# Patient Record
Sex: MALE | Race: WHITE | HISPANIC OR LATINO | URBAN - METROPOLITAN AREA
[De-identification: names, ages, dates, MRNs, and addresses within clinical notes are randomized per-mention and may not be internally consistent; named-entity substitution may affect disease eponyms.]

---

## 2020-11-27 ENCOUNTER — EMERGENCY (EMERGENCY)
Facility: HOSPITAL | Age: 50
LOS: 1 days | Discharge: HOME | End: 2020-11-27
Attending: EMERGENCY MEDICINE | Admitting: EMERGENCY MEDICINE
Payer: MEDICAID

## 2020-11-27 VITALS
DIASTOLIC BLOOD PRESSURE: 80 MMHG | HEIGHT: 73 IN | HEART RATE: 82 BPM | WEIGHT: 235.01 LBS | SYSTOLIC BLOOD PRESSURE: 158 MMHG | RESPIRATION RATE: 18 BRPM | TEMPERATURE: 99 F | OXYGEN SATURATION: 97 %

## 2020-11-27 DIAGNOSIS — Z91.011 ALLERGY TO MILK PRODUCTS: ICD-10-CM

## 2020-11-27 DIAGNOSIS — R07.89 OTHER CHEST PAIN: ICD-10-CM

## 2020-11-27 DIAGNOSIS — R05 COUGH: ICD-10-CM

## 2020-11-27 DIAGNOSIS — Z20.828 CONTACT WITH AND (SUSPECTED) EXPOSURE TO OTHER VIRAL COMMUNICABLE DISEASES: ICD-10-CM

## 2020-11-27 DIAGNOSIS — R07.9 CHEST PAIN, UNSPECIFIED: ICD-10-CM

## 2020-11-27 LAB
ALBUMIN SERPL ELPH-MCNC: 3.9 G/DL — SIGNIFICANT CHANGE UP (ref 3.4–5)
ALP SERPL-CCNC: 63 U/L — SIGNIFICANT CHANGE UP (ref 40–120)
ALT FLD-CCNC: 40 U/L — SIGNIFICANT CHANGE UP (ref 12–42)
AMPHET UR-MCNC: NEGATIVE — SIGNIFICANT CHANGE UP
ANION GAP SERPL CALC-SCNC: 11 MMOL/L — SIGNIFICANT CHANGE UP (ref 9–16)
APTT BLD: 29 SEC — SIGNIFICANT CHANGE UP (ref 27.5–35.5)
AST SERPL-CCNC: 28 U/L — SIGNIFICANT CHANGE UP (ref 15–37)
BARBITURATES UR SCN-MCNC: NEGATIVE — SIGNIFICANT CHANGE UP
BASOPHILS # BLD AUTO: 0.08 K/UL — SIGNIFICANT CHANGE UP (ref 0–0.2)
BASOPHILS NFR BLD AUTO: 0.7 % — SIGNIFICANT CHANGE UP (ref 0–2)
BENZODIAZ UR-MCNC: NEGATIVE — SIGNIFICANT CHANGE UP
BILIRUB SERPL-MCNC: 0.3 MG/DL — SIGNIFICANT CHANGE UP (ref 0.2–1.2)
BUN SERPL-MCNC: 27 MG/DL — HIGH (ref 7–23)
CALCIUM SERPL-MCNC: 9.4 MG/DL — SIGNIFICANT CHANGE UP (ref 8.5–10.5)
CHLORIDE SERPL-SCNC: 102 MMOL/L — SIGNIFICANT CHANGE UP (ref 96–108)
CK SERPL-CCNC: 818 U/L — HIGH (ref 39–308)
CO2 SERPL-SCNC: 28 MMOL/L — SIGNIFICANT CHANGE UP (ref 22–31)
COCAINE METAB.OTHER UR-MCNC: NEGATIVE — SIGNIFICANT CHANGE UP
CREAT SERPL-MCNC: 1.47 MG/DL — HIGH (ref 0.5–1.3)
D DIMER BLD IA.RAPID-MCNC: <187 NG/ML DDU — SIGNIFICANT CHANGE UP
EOSINOPHIL # BLD AUTO: 0.12 K/UL — SIGNIFICANT CHANGE UP (ref 0–0.5)
EOSINOPHIL NFR BLD AUTO: 1 % — SIGNIFICANT CHANGE UP (ref 0–6)
GLUCOSE SERPL-MCNC: 147 MG/DL — HIGH (ref 70–99)
HCT VFR BLD CALC: 39.5 % — SIGNIFICANT CHANGE UP (ref 39–50)
HGB BLD-MCNC: 13.5 G/DL — SIGNIFICANT CHANGE UP (ref 13–17)
IMM GRANULOCYTES NFR BLD AUTO: 0.3 % — SIGNIFICANT CHANGE UP (ref 0–1.5)
INR BLD: 1.02 — SIGNIFICANT CHANGE UP (ref 0.88–1.16)
LYMPHOCYTES # BLD AUTO: 37.4 % — SIGNIFICANT CHANGE UP (ref 13–44)
LYMPHOCYTES # BLD AUTO: 4.45 K/UL — HIGH (ref 1–3.3)
MAGNESIUM SERPL-MCNC: 2.1 MG/DL — SIGNIFICANT CHANGE UP (ref 1.6–2.6)
MCHC RBC-ENTMCNC: 31 PG — SIGNIFICANT CHANGE UP (ref 27–34)
MCHC RBC-ENTMCNC: 34.2 GM/DL — SIGNIFICANT CHANGE UP (ref 32–36)
MCV RBC AUTO: 90.8 FL — SIGNIFICANT CHANGE UP (ref 80–100)
METHADONE UR-MCNC: NEGATIVE — SIGNIFICANT CHANGE UP
MONOCYTES # BLD AUTO: 0.95 K/UL — HIGH (ref 0–0.9)
MONOCYTES NFR BLD AUTO: 8 % — SIGNIFICANT CHANGE UP (ref 2–14)
NEUTROPHILS # BLD AUTO: 6.27 K/UL — SIGNIFICANT CHANGE UP (ref 1.8–7.4)
NEUTROPHILS NFR BLD AUTO: 52.6 % — SIGNIFICANT CHANGE UP (ref 43–77)
NRBC # BLD: 0 /100 WBCS — SIGNIFICANT CHANGE UP (ref 0–0)
NT-PROBNP SERPL-SCNC: 21 PG/ML — SIGNIFICANT CHANGE UP
OPIATES UR-MCNC: NEGATIVE — SIGNIFICANT CHANGE UP
PCP SPEC-MCNC: SIGNIFICANT CHANGE UP
PCP UR-MCNC: NEGATIVE — SIGNIFICANT CHANGE UP
PLATELET # BLD AUTO: 295 K/UL — SIGNIFICANT CHANGE UP (ref 150–400)
POTASSIUM SERPL-MCNC: 3.4 MMOL/L — LOW (ref 3.5–5.3)
POTASSIUM SERPL-SCNC: 3.4 MMOL/L — LOW (ref 3.5–5.3)
PROT SERPL-MCNC: 7.3 G/DL — SIGNIFICANT CHANGE UP (ref 6.4–8.2)
PROTHROM AB SERPL-ACNC: 12.2 SEC — SIGNIFICANT CHANGE UP (ref 10.6–13.6)
RBC # BLD: 4.35 M/UL — SIGNIFICANT CHANGE UP (ref 4.2–5.8)
RBC # FLD: 13.1 % — SIGNIFICANT CHANGE UP (ref 10.3–14.5)
SARS-COV-2 RNA SPEC QL NAA+PROBE: SIGNIFICANT CHANGE UP
SODIUM SERPL-SCNC: 141 MMOL/L — SIGNIFICANT CHANGE UP (ref 132–145)
THC UR QL: POSITIVE
TROPONIN I SERPL-MCNC: <0.017 NG/ML — LOW (ref 0.02–0.06)
TROPONIN I SERPL-MCNC: <0.017 NG/ML — LOW (ref 0.02–0.06)
WBC # BLD: 11.91 K/UL — HIGH (ref 3.8–10.5)
WBC # FLD AUTO: 11.91 K/UL — HIGH (ref 3.8–10.5)

## 2020-11-27 PROCEDURE — 93010 ELECTROCARDIOGRAM REPORT: CPT

## 2020-11-27 PROCEDURE — 71045 X-RAY EXAM CHEST 1 VIEW: CPT | Mod: 26

## 2020-11-27 PROCEDURE — 99285 EMERGENCY DEPT VISIT HI MDM: CPT | Mod: 25

## 2020-11-27 RX ORDER — IBUPROFEN 200 MG
600 TABLET ORAL ONCE
Refills: 0 | Status: DISCONTINUED | OUTPATIENT
Start: 2020-11-27 | End: 2020-11-27

## 2020-11-27 RX ORDER — AZITHROMYCIN 500 MG/1
500 TABLET, FILM COATED ORAL ONCE
Refills: 0 | Status: DISCONTINUED | OUTPATIENT
Start: 2020-11-27 | End: 2020-11-27

## 2020-11-27 RX ORDER — KETOROLAC TROMETHAMINE 30 MG/ML
30 SYRINGE (ML) INJECTION ONCE
Refills: 0 | Status: DISCONTINUED | OUTPATIENT
Start: 2020-11-27 | End: 2020-11-27

## 2020-11-27 RX ADMIN — Medication 30 MILLIGRAM(S): at 07:46

## 2020-11-27 NOTE — ED ADULT NURSE NOTE - CAS TRG GENERAL NORM CIRC DET
Telephone Encounter by Jenelle Dhillon MD at 09/14/17 10:18 AM     Author:  Jenelle Dhillon MD Service:  (none) Author Type:  Physician     Filed:  09/14/17 10:18 AM Encounter Date:  9/14/2017 Status:  Signed     :  Jenelle Dhillon MD (Physician)            Please call patient, if not having GI symptoms remove from OV and schedule directly for colonocopy.  Screening  suprep[RA1.1T]        Revision History        User Key Date/Time User Provider Type Action    > RA1.1 09/14/17 10:18 AM Jenelle Dhillon MD Physician Sign    T - Template            
Telephone Encounter by Neahl Mix RMA at 09/14/17 10:29 AM     Author:  Nehal Mix RMA Service:  (none) Author Type:  Medical Assistant     Filed:  09/14/17 10:29 AM Encounter Date:  9/14/2017 Status:  Signed     :  Nehal Mix RMA (Medical Assistant)            Left message on answering machine to call back.  Electronically Signed by:    SAM Rosas , 9/14/2017[LR1.1T]        Revision History        User Key Date/Time User Provider Type Action    > LR1.1 09/14/17 10:29 AM Nehal Mix RMA Medical Assistant Sign    T - Template            
Strong peripheral pulses

## 2020-11-27 NOTE — ED PROVIDER NOTE - CLINICAL SUMMARY MEDICAL DECISION MAKING FREE TEXT BOX
plan to do trop, ddimer, low suspicion for pe, perc neg, ekg wnl, do not suspect dissection, compliant with medications, vss.

## 2020-11-27 NOTE — ED PROVIDER NOTE - CARE PROVIDER_API CALL
Esdras Suárez  CARDIOVASCULAR DISEASE  [address] [facility], 3rd [place] [address]  Phone: [phone]  Fax: [phone]  Follow Up Time:    Esdras Suárez  CARDIOVASCULAR DISEASE  [address], [address]rd [place] [address]  Phone: [phone]  Fax: [phone]  Follow Up Time:     Clary Mcdonough  CRITICAL CARE MEDICINE  [facility] [address] [place] [address]  Phone: [phone]  Fax: [phone]  Follow Up Time:

## 2020-11-27 NOTE — ED ADULT NURSE NOTE - OBJECTIVE STATEMENT
49 yo M c/o chest pain. Pt reports while at rest feeling a sharp pain radiating through his L chest and down his side. Pt also reports associated neck pain. Pt states he was recently carrying heavy bags on his L side and may have strained himself. No diaphoresis noted, no dyspnea. Denies SOB, N/V/D, headache, dizziness, fever/chills, numbness/tingling, change in bowel or bladder habits. Pt speaking in full complete sentences, ambulatory with steady gait.

## 2020-11-27 NOTE — ED PROVIDER NOTE - PATIENT PORTAL LINK FT
You can access the FollowMyHealth Patient Portal offered by [facility] by registering at the following website: [email or web address]. By joining [facility]’s FollowMyHealth portal, you will also be able to view your health information using other applications (apps) compatible with our system.

## 2020-11-27 NOTE — ED PROVIDER NOTE - PROVIDER TOKENS
PROVIDER:[TOKEN:[[id number]]] PROVIDER:[TOKEN:[[id number]]],PROVIDER:[TOKEN:[[id number]]]

## 2020-11-27 NOTE — ED PROVIDER NOTE - PROGRESS NOTE DETAILS
patient notes improvement. notes 3 weeks ago was treated for 7 days with augmentin for sinus infection. will start on levaquin and repeat trop at 9 am and likely refer to dr medina. spoke to dr medina and will see in the office. signed out to dr zurita normal trop x 2, pt is well appearing, will recommend f/u w cardio and pulm for persistent cough. covid neg here today. copy of results to be provided.

## 2020-11-27 NOTE — ED PROVIDER NOTE - CARE PROVIDERS DIRECT ADDRESSES
[email or web address]

## 2020-11-27 NOTE — ED PROVIDER NOTE - CPE EDP ENMT NORM
Pt lying on cot, when asking pt to change into gown, pt refused and stated \"I'm too cold I dont want to wear one. \" Applied warm blankets on pt. Pt denies any further needs. Call light within reach will continue to monitor.      Henok Michaud RN  08/08/19 [id number]
Pt sitting up at edge of bed. Warm blanket provided for patient comfort. Side rails up x2 with call light in reach. Will continue to monitor.        Zenaida Martinez RN  08/08/19 [id number]
normal...

## 2020-11-27 NOTE — ED ADULT NURSE NOTE - CHPI ED NUR SYMPTOMS NEG
no dizziness/no diaphoresis/no vomiting/no fever/no shortness of breath/no nausea/no congestion/no back pain/no chills/no syncope

## 2020-11-27 NOTE — ED PROVIDER NOTE - OBJECTIVE STATEMENT
patient presents with new onset L sided chest pain, under his pectoral muscle, which started at 5:15 this morning, intermittent, worse with expiration, radiating over L chest. initially felt like a sore rib, but then noticed some radiation and tingling to body and base of neck. denies focal weakness, denies N/V, denies fever, chills, cough. took 2 asa prior to arrival with no improvement. notes 2 months ago, had a dry cough, covid negative 1.5 months ago but cough still persists with intermittent mucous. denies sick contacts. notes 12 days ago traveled [facility] by bus round trip. Hx of HTN, anxiety on lisinopril, and amlodipine. patient presents with new onset L sided chest pain, under his pectoral muscle, which started at 5:15 this morning, intermittent, worse with expiration, radiating over L chest. initially felt like a sore rib, but then noticed some radiation and tingling to body and base of neck. denies focal weakness, denies N/V, denies fever, chills, cough. took 2 asa prior to arrival with no improvement. notes 2 months ago, had a dry cough, covid negative 1.5 months ago but cough still persists with intermittent mucous. denies sick contacts. notes 12 days ago traveled [facility] by bus round trip. Hx of HTN, anxiety on lisinopril, and amlodipine. patient notes no prior cardiac workup or admissions in the past.

## 2020-11-28 RX ORDER — AZITHROMYCIN 500 MG/1
1 TABLET, FILM COATED ORAL
Qty: 6 | Refills: 0
Start: 2020-11-28 | End: 2020-12-02

## 2020-11-28 RX ORDER — SACCHAROMYCES BOULARDII 250 MG
1 POWDER IN PACKET (EA) ORAL
Qty: 14 | Refills: 0
Start: 2020-11-28 | End: 2020-12-04

## 2020-11-28 NOTE — ED POST DISCHARGE NOTE - ADDITIONAL DOCUMENTATION
pt called back. notes he has had worsening of his arthritis pains since starting the levaquin and also describes some migrating tingling sensation. requesting medication switch to azithromycin. sent azithromycin and florastor. pt advised to return to ER for evaluation of numbness, tingling, or if any other new or concerning symptoms arise.

## 2021-06-23 ENCOUNTER — INPATIENT (INPATIENT)
Facility: HOSPITAL | Age: 51
LOS: 6 days | Discharge: HOME HEALTH | DRG: 177 | End: 2021-06-30
Attending: HOSPITALIST | Admitting: HOSPITALIST
Payer: COMMERCIAL

## 2021-06-23 VITALS
HEIGHT: 73 IN | HEART RATE: 104 BPM | SYSTOLIC BLOOD PRESSURE: 108 MMHG | DIASTOLIC BLOOD PRESSURE: 66 MMHG | WEIGHT: 238.1 LBS | RESPIRATION RATE: 16 BRPM | TEMPERATURE: 100 F | OXYGEN SATURATION: 88 %

## 2021-06-23 LAB
ALBUMIN SERPL ELPH-MCNC: 3.4 G/DL — SIGNIFICANT CHANGE UP (ref 3.4–5)
ALP SERPL-CCNC: 89 U/L — SIGNIFICANT CHANGE UP (ref 40–120)
ALT FLD-CCNC: 37 U/L — SIGNIFICANT CHANGE UP (ref 12–42)
ANION GAP SERPL CALC-SCNC: 12 MMOL/L — SIGNIFICANT CHANGE UP (ref 9–16)
APPEARANCE UR: CLEAR — SIGNIFICANT CHANGE UP
APTT BLD: 32.6 SEC — SIGNIFICANT CHANGE UP (ref 27.5–35.5)
AST SERPL-CCNC: 46 U/L — HIGH (ref 15–37)
BASOPHILS # BLD AUTO: 0.01 K/UL — SIGNIFICANT CHANGE UP (ref 0–0.2)
BASOPHILS NFR BLD AUTO: 0.2 % — SIGNIFICANT CHANGE UP (ref 0–2)
BILIRUB SERPL-MCNC: 0.6 MG/DL — SIGNIFICANT CHANGE UP (ref 0.2–1.2)
BILIRUB UR-MCNC: NEGATIVE — SIGNIFICANT CHANGE UP
BUN SERPL-MCNC: 16 MG/DL — SIGNIFICANT CHANGE UP (ref 7–23)
CALCIUM SERPL-MCNC: 8.4 MG/DL — LOW (ref 8.5–10.5)
CHLORIDE SERPL-SCNC: 99 MMOL/L — SIGNIFICANT CHANGE UP (ref 96–108)
CO2 SERPL-SCNC: 26 MMOL/L — SIGNIFICANT CHANGE UP (ref 22–31)
COLOR SPEC: YELLOW — SIGNIFICANT CHANGE UP
CREAT SERPL-MCNC: 1.34 MG/DL — HIGH (ref 0.5–1.3)
CRP SERPL-MCNC: 103 MG/L — HIGH (ref 0–9)
D DIMER BLD IA.RAPID-MCNC: 195 NG/ML DDU — SIGNIFICANT CHANGE UP
DIFF PNL FLD: NEGATIVE — SIGNIFICANT CHANGE UP
EOSINOPHIL # BLD AUTO: 0 K/UL — SIGNIFICANT CHANGE UP (ref 0–0.5)
EOSINOPHIL NFR BLD AUTO: 0 % — SIGNIFICANT CHANGE UP (ref 0–6)
GLUCOSE BLDC GLUCOMTR-MCNC: 158 MG/DL — HIGH (ref 70–99)
GLUCOSE SERPL-MCNC: 143 MG/DL — HIGH (ref 70–99)
GLUCOSE UR QL: NEGATIVE — SIGNIFICANT CHANGE UP
HCT VFR BLD CALC: 42.1 % — SIGNIFICANT CHANGE UP (ref 39–50)
HGB BLD-MCNC: 14.4 G/DL — SIGNIFICANT CHANGE UP (ref 13–17)
IMM GRANULOCYTES NFR BLD AUTO: 0.4 % — SIGNIFICANT CHANGE UP (ref 0–1.5)
INR BLD: 1.21 — HIGH (ref 0.88–1.16)
KETONES UR-MCNC: 15 MG/DL
LACTATE SERPL-SCNC: 1.4 MMOL/L — SIGNIFICANT CHANGE UP (ref 0.4–2)
LEUKOCYTE ESTERASE UR-ACNC: NEGATIVE — SIGNIFICANT CHANGE UP
LYMPHOCYTES # BLD AUTO: 0.66 K/UL — LOW (ref 1–3.3)
LYMPHOCYTES # BLD AUTO: 13.5 % — SIGNIFICANT CHANGE UP (ref 13–44)
MAGNESIUM SERPL-MCNC: 1.7 MG/DL — SIGNIFICANT CHANGE UP (ref 1.6–2.6)
MANUAL SMEAR VERIFICATION: SIGNIFICANT CHANGE UP
MCHC RBC-ENTMCNC: 30.8 PG — SIGNIFICANT CHANGE UP (ref 27–34)
MCHC RBC-ENTMCNC: 34.2 GM/DL — SIGNIFICANT CHANGE UP (ref 32–36)
MCV RBC AUTO: 90 FL — SIGNIFICANT CHANGE UP (ref 80–100)
MONOCYTES # BLD AUTO: 0.16 K/UL — SIGNIFICANT CHANGE UP (ref 0–0.9)
MONOCYTES NFR BLD AUTO: 3.3 % — SIGNIFICANT CHANGE UP (ref 2–14)
NEUTROPHILS # BLD AUTO: 4.05 K/UL — SIGNIFICANT CHANGE UP (ref 1.8–7.4)
NEUTROPHILS NFR BLD AUTO: 82.6 % — HIGH (ref 43–77)
NITRITE UR-MCNC: NEGATIVE — SIGNIFICANT CHANGE UP
NRBC # BLD: 0 /100 WBCS — SIGNIFICANT CHANGE UP (ref 0–0)
NT-PROBNP SERPL-SCNC: 24 PG/ML — SIGNIFICANT CHANGE UP
PH UR: 6 — SIGNIFICANT CHANGE UP (ref 5–8)
PLAT MORPH BLD: NORMAL — SIGNIFICANT CHANGE UP
PLATELET # BLD AUTO: 167 K/UL — SIGNIFICANT CHANGE UP (ref 150–400)
POTASSIUM SERPL-MCNC: 3.1 MMOL/L — LOW (ref 3.5–5.3)
POTASSIUM SERPL-SCNC: 3.1 MMOL/L — LOW (ref 3.5–5.3)
PROT SERPL-MCNC: 7.4 G/DL — SIGNIFICANT CHANGE UP (ref 6.4–8.2)
PROT UR-MCNC: NEGATIVE MG/DL — SIGNIFICANT CHANGE UP
PROTHROM AB SERPL-ACNC: 14.1 SEC — HIGH (ref 10.6–13.6)
RBC # BLD: 4.68 M/UL — SIGNIFICANT CHANGE UP (ref 4.2–5.8)
RBC # FLD: 12.8 % — SIGNIFICANT CHANGE UP (ref 10.3–14.5)
RBC BLD AUTO: NORMAL — SIGNIFICANT CHANGE UP
SARS-COV-2 RNA SPEC QL NAA+PROBE: DETECTED
SODIUM SERPL-SCNC: 137 MMOL/L — SIGNIFICANT CHANGE UP (ref 132–145)
SP GR SPEC: 1.01 — SIGNIFICANT CHANGE UP (ref 1–1.03)
TROPONIN I SERPL-MCNC: <0.017 NG/ML — LOW (ref 0.02–0.06)
TSH SERPL-MCNC: 2.02 UIU/ML — SIGNIFICANT CHANGE UP (ref 0.36–3.74)
UROBILINOGEN FLD QL: 0.2 E.U./DL — SIGNIFICANT CHANGE UP
WBC # BLD: 4.9 K/UL — SIGNIFICANT CHANGE UP (ref 3.8–10.5)
WBC # FLD AUTO: 4.9 K/UL — SIGNIFICANT CHANGE UP (ref 3.8–10.5)

## 2021-06-23 PROCEDURE — 93010 ELECTROCARDIOGRAM REPORT: CPT

## 2021-06-23 PROCEDURE — 99223 1ST HOSP IP/OBS HIGH 75: CPT | Mod: GC

## 2021-06-23 PROCEDURE — 71250 CT THORAX DX C-: CPT | Mod: 26

## 2021-06-23 PROCEDURE — 71045 X-RAY EXAM CHEST 1 VIEW: CPT | Mod: 26

## 2021-06-23 PROCEDURE — 99291 CRITICAL CARE FIRST HOUR: CPT

## 2021-06-23 RX ORDER — REMDESIVIR 5 MG/ML
200 INJECTION INTRAVENOUS EVERY 24 HOURS
Refills: 0 | Status: COMPLETED | OUTPATIENT
Start: 2021-06-23 | End: 2021-06-23

## 2021-06-23 RX ORDER — REMDESIVIR 5 MG/ML
100 INJECTION INTRAVENOUS EVERY 24 HOURS
Refills: 0 | Status: COMPLETED | OUTPATIENT
Start: 2021-06-24 | End: 2021-06-27

## 2021-06-23 RX ORDER — PANTOPRAZOLE SODIUM 20 MG/1
40 TABLET, DELAYED RELEASE ORAL
Refills: 0 | Status: DISCONTINUED | OUTPATIENT
Start: 2021-06-24 | End: 2021-06-30

## 2021-06-23 RX ORDER — DEXAMETHASONE 0.5 MG/5ML
6 ELIXIR ORAL EVERY 24 HOURS
Refills: 0 | Status: DISCONTINUED | OUTPATIENT
Start: 2021-06-24 | End: 2021-06-30

## 2021-06-23 RX ORDER — POTASSIUM CHLORIDE 20 MEQ
40 PACKET (EA) ORAL EVERY 4 HOURS
Refills: 0 | Status: COMPLETED | OUTPATIENT
Start: 2021-06-23 | End: 2021-06-23

## 2021-06-23 RX ORDER — KETOROLAC TROMETHAMINE 30 MG/ML
15 SYRINGE (ML) INJECTION ONCE
Refills: 0 | Status: DISCONTINUED | OUTPATIENT
Start: 2021-06-23 | End: 2021-06-23

## 2021-06-23 RX ORDER — ACETAMINOPHEN 500 MG
975 TABLET ORAL ONCE
Refills: 0 | Status: COMPLETED | OUTPATIENT
Start: 2021-06-23 | End: 2021-06-23

## 2021-06-23 RX ORDER — ACETAMINOPHEN 500 MG
650 TABLET ORAL EVERY 6 HOURS
Refills: 0 | Status: DISCONTINUED | OUTPATIENT
Start: 2021-06-23 | End: 2021-06-30

## 2021-06-23 RX ORDER — MAGNESIUM SULFATE 500 MG/ML
1 VIAL (ML) INJECTION ONCE
Refills: 0 | Status: COMPLETED | OUTPATIENT
Start: 2021-06-23 | End: 2021-06-23

## 2021-06-23 RX ORDER — SODIUM CHLORIDE 9 MG/ML
2500 INJECTION INTRAMUSCULAR; INTRAVENOUS; SUBCUTANEOUS ONCE
Refills: 0 | Status: COMPLETED | OUTPATIENT
Start: 2021-06-23 | End: 2021-06-23

## 2021-06-23 RX ORDER — REMDESIVIR 5 MG/ML
INJECTION INTRAVENOUS
Refills: 0 | Status: COMPLETED | OUTPATIENT
Start: 2021-06-23 | End: 2021-06-27

## 2021-06-23 RX ORDER — INSULIN LISPRO 100/ML
VIAL (ML) SUBCUTANEOUS
Refills: 0 | Status: DISCONTINUED | OUTPATIENT
Start: 2021-06-23 | End: 2021-06-30

## 2021-06-23 RX ORDER — ENOXAPARIN SODIUM 100 MG/ML
40 INJECTION SUBCUTANEOUS EVERY 24 HOURS
Refills: 0 | Status: DISCONTINUED | OUTPATIENT
Start: 2021-06-23 | End: 2021-06-24

## 2021-06-23 RX ORDER — INSULIN LISPRO 100/ML
VIAL (ML) SUBCUTANEOUS AT BEDTIME
Refills: 0 | Status: DISCONTINUED | OUTPATIENT
Start: 2021-06-23 | End: 2021-06-30

## 2021-06-23 RX ORDER — DEXAMETHASONE 0.5 MG/5ML
10 ELIXIR ORAL ONCE
Refills: 0 | Status: COMPLETED | OUTPATIENT
Start: 2021-06-23 | End: 2021-06-23

## 2021-06-23 RX ADMIN — ENOXAPARIN SODIUM 40 MILLIGRAM(S): 100 INJECTION SUBCUTANEOUS at 21:38

## 2021-06-23 RX ADMIN — Medication 40 MILLIEQUIVALENT(S): at 23:19

## 2021-06-23 RX ADMIN — Medication 15 MILLIGRAM(S): at 18:35

## 2021-06-23 RX ADMIN — Medication 975 MILLIGRAM(S): at 18:35

## 2021-06-23 RX ADMIN — Medication 40 MILLIEQUIVALENT(S): at 21:38

## 2021-06-23 RX ADMIN — SODIUM CHLORIDE 2500 MILLILITER(S): 9 INJECTION INTRAMUSCULAR; INTRAVENOUS; SUBCUTANEOUS at 09:45

## 2021-06-23 RX ADMIN — Medication 100 GRAM(S): at 21:38

## 2021-06-23 RX ADMIN — Medication 102 MILLIGRAM(S): at 12:45

## 2021-06-23 RX ADMIN — Medication 15 MILLIGRAM(S): at 09:45

## 2021-06-23 NOTE — ED PROVIDER NOTE - CLINICAL SUMMARY MEDICAL DECISION MAKING FREE TEXT BOX
49 y/o M sent to ED for MAB treatment present with increasing SOB and hypoxia in setting of recently diagnosed COVID. Pt is not vaccinated. Pt is currently 94% oxygen saturation on 5 L. Pt will require admission and no longer qualifies for MAB due to low oxygen saturation. Will hydrate as pt appears very dehydrated clinically. Pt labs are consistent with COVID, chest X-ray shows visible bilateral multifocal pneumonia. Antibiotics held as clinical picture is consistent with viral sepsis. Will admit to [facility]

## 2021-06-23 NOTE — H&P ADULT - PROBLEM SELECTOR PLAN 1
Presenting w/ malaise, body aches, fevers x 1 week, now w/ hypoxia to 79% at home. SpO2 88% on room air in the ED. Pt w/ subjective dyspnea even walking short distances to the bathroom. Requiring 6LNC on admission.  - C/w supplemental O2 via nasal cannula to maintain SpO2 >88%  - Low threshold for ICU consult if O2 requirements increase to nonrebreather  - Management of COVID pneumonia as above

## 2021-06-23 NOTE — ED ADULT NURSE NOTE - OBJECTIVE STATEMENT
Pt aox3 with steady gait on arrival. Pt states he was diagnosed with covid with last week. Symptoms worsening with increased sob and fatigue. Fever max of 101 at home. denies chest pain on arrival. ekg done on arrival.

## 2021-06-23 NOTE — H&P ADULT - NSHPPHYSICALEXAM_GEN_ALL_CORE
VITAL SIGNS:  T(C): 37.3 (06-23-21 @ 20:54), Max: 37.8 (06-23-21 @ 09:12)  T(F): 99.2 (06-23-21 @ 20:54), Max: 100.1 (06-23-21 @ 09:12)  HR: 87 (06-23-21 @ 21:30) (80 - 104)  BP: 146/80 (06-23-21 @ 20:54) (100/60 - 146/80)  BP(mean): --  RR: 18 (06-23-21 @ 20:54) (16 - 20)  SpO2: 94% (06-23-21 @ 21:30) (88% - 94%)  Wt(kg): --    PHYSICAL EXAM:  Constitutional: WDWN resting comfortably in bed; NAD on 6LNC  Head: NC/AT  Eyes: PERRL, EOMI, anicteric sclera  ENT: no nasal discharge; uvula midline, no oropharyngeal erythema or exudates; dry MM  Neck: supple; no JVD or thyromegaly  Respiratory: decreased breath sounds in b/l bases, otherwise CTA B/L; no W/R/R, no retractions  Cardiac: +S1/S2; RRR; no M/R/G; PMI non-displaced  Gastrointestinal: abdomen soft, NT/ND; no rebound or guarding; +BSx4  Back: spine midline, no bony tenderness or step-offs; no CVAT B/L  Extremities: WWP, no clubbing or cyanosis; no peripheral edema  Musculoskeletal: NROM x4; no joint swelling, tenderness or erythema  Vascular: 2+ radial, DP/PT pulses B/L  Dermatologic: skin warm, dry and intact; no rashes, wounds, or scars  Lymphatic: no submandibular or cervical LAD  Neurologic: AAOx3; CNII-XII grossly intact; no focal deficits  Psychiatric: affect and characteristics of appearance, verbalizations, behaviors are appropriate

## 2021-06-23 NOTE — H&P ADULT - NSHPLABSRESULTS_GEN_ALL_CORE
LABS:                         14.4   4.90  )-----------( 167      ( 2021 09:44 )             42.1         137  |  99  |  16  ----------------------------<  143<H>  3.1<L>   |  26  |  1.34<H>    Ca    8.4<L>      2021 09:44  Mg     1.7         TPro  7.4  /  Alb  3.4  /  TBili  0.6  /  DBili  x   /  AST  46<H>  /  ALT  37  /  AlkPhos  89      PT/INR - ( 2021 09:58 )   PT: 14.1 sec;   INR: 1.21     PTT - ( 2021 09:58 )  PTT:32.6 sec    Urinalysis Basic - ( 2021 11:17 )  Color: Yellow / Appearance: Clear / S.010 / pH: x  Gluc: x / Ketone: 15 mg/dL  / Bili: NEGATIVE / Urobili: 0.2 E.U./dL   Blood: x / Protein: NEGATIVE mg/dL / Nitrite: NEGATIVE   Leuk Esterase: NEGATIVE / RBC: x / WBC x   Sq Epi: x / Non Sq Epi: x / Bacteria: x      CARDIAC MARKERS ( 2021 09:44 )  <0.017 ng/mL / x     / x     / x     / x            RADIOLOGY, EKG & ADDITIONAL TESTS: Reviewed.     < from: CT Chest No Cont (21 @ 12:32) >  Impression:  Moderate patchy predominantly peripheral groundglass airspace opacities throughout both lungs likely reflective of atypical/viral pneumonia such as Covid 19.  < end of copied text >

## 2021-06-23 NOTE — H&P ADULT - PROBLEM SELECTOR PLAN 2
COVID+ x 1 week, now presenting w/ acute hypoxemic respiratory failure as above. CT chest w/ moderate patchy predominantly peripheral groundglass airspace opacities throughout both lungs likely reflective of atypical/viral pneumonia consistent w/ COVID pneumonia. Risk factors include recent trip to the beach and unvaccinated status. D-dimer wnl on admission.  - C/w Dexamethasone 6mg x 10 days total  - Approved for Remdesivir x 5 days  - F/u AM procalcitonin given report of productive sputum, if elevated would consider treatment for bacterial pneumonia  - O2 supplementation as above  - Trend CRP and ferritin, repeat d-dimer if new s/s for clot

## 2021-06-23 NOTE — H&P ADULT - ATTENDING COMMENTS
reviewed pertinent data , h&p  patient seen and examined overnight     PE as above     1. AHRF 2/2 covid : c/w decadron, starting remdesivir, wean off o2 as tolerated. trend inflammatory markers , followup procalcitonin level.   2. monitor renal function, holding losartan-hctz pending repeat renal function        rest of  plan as above

## 2021-06-23 NOTE — H&P ADULT - PROBLEM SELECTOR PROBLEM 5
Amelie Sheth   MRN: [id number]    Department:  [facility] Emergency Department   Date of Visit:  1/5/2019           Disclosure     Insurance plans vary and the physician(s) referred by the ER may not be covered by your plan.  Please contac CARE PHYSICIAN AT ONCE OR RETURN IMMEDIATELY TO THE EMERGENCY DEPARTMENT. If you have been prescribed any medication(s), please fill your prescription right away and begin taking the medication(s) as directed.   If you believe that any of the medications Hypertension, unspecified type

## 2021-06-23 NOTE — ED PROVIDER NOTE - PROGRESS NOTE DETAILS
Pt requiring 5-6 L of Oxygen by NC. Pt desaturated to 79% when walking to bathroom. Awaiting call back Nohemi guzman for admission Accepted to [facility] by Dr. Montalvo, patient aware and agreeable.

## 2021-06-23 NOTE — H&P ADULT - ASSESSMENT
49 y/o M w/ PMHx of HTN, IBS, and obesity presenting w/ hypoxia in setting of COVID+ x 1 week. Admitted for acute hypoxemic respiratory failure 2/2 COVID pneumonia.

## 2021-06-23 NOTE — H&P ADULT - HISTORY OF PRESENT ILLNESS
49 y/o M w/ PMHx of HTN, IBS, and obesity presenting w/ hypoxia in setting of COVID+ x 1 week. Weekend of 6/12-13, pt went to the beach in [place]. The following Monday 6/14 he started feeling malaise then started feeling feverish with full body aches, loss of appetite and taste. Pt tested positive for COVID on 6/16. Over the course of the week his symptoms worsened w/ fevers up to 101.9F, then started having GRECO and associated mild intermittent mid substernal chest tightness. Pt purchased a pulse oximeter which read an SpO2 of 79% yesterday improved to 90% with deep breaths. Due to the hypoxia pt was advised by his PMD to go to the ED. Pt has not yet received the vaccination as he works alone and "just had not gotten around to it". No recent out-of-state or international travel. Pt's  whom he lives with also tested positive 2 days afterwards, has low grade fevers however is reported to be stable without hypoxia at home.  ROS additionally positive for intermittent productive cough w/ thick sputum, at times green-tinged.  Pt denies any SOB or CP at rest, abd pain, constipation, urinary symptoms, LE pain or swelling. Pt has chronic diarrhea 2/2 IBS he takes prn Bismuth subsalicylate for.    ED vitals: T 100.1F, /66, , RR 16, SpO2 88% on RA   ED labs s/f: Creatinine 1.34, AST 46, , COVID+  ED imaging: CXR w/ bilateral infiltrates, CT chest w/ moderate patchy predominantly peripheral groundglass airspace opacities throughout both lungs likely reflective of atypical/viral pneumonia such as Covid 19.  ED treatment: 2.5L NS, Ketorolac x2, Tylenol, Dexamethasone 10mg x1

## 2021-06-23 NOTE — ED PROVIDER NOTE - OBJECTIVE STATEMENT
49 y/o M with PMHx of HTN was sent to ED for MAB treatment as pt was diagnosed with COVID last Wednesday, began to have mild symptoms 2 days prior to Wednesday. Over the past 4 days, pt notes higher and higher fever along with body ache, malaise, worsening SOB, and thirst. Associated mild, intermittent chest pain was also experienced with worsening SOB. Pt is not experiencing chest pain at the time of evaluation. Pt purchased a pulse oximeter which read an oxyegn saturation of 79% yesterday. After several deep breathes pt was able to bring it up to 90%. Pt denies diarrhea, loss of smell or taste. Pt last took 1000mg of Tylenol 8am. PCP is Dr. Salas. Pt has not yet received the vaccination as he works alone and "just had not gotten around to it".

## 2021-06-23 NOTE — ED ADULT NURSE NOTE - NS ED NURSE RECORD ANOTHER VITAL SIGN
Detail Level: Zone Continue Regimen: Mtx (20mg qw) (under Dr. Glenna gabriel) \\nRemicade 600mg infusion q 6 weeks (under Dr. Glenna gabriel) Action 4: Continue Otc Regimen: Vinegar and water soaks once daily Samples Given: Duobrii lotion twice daily (3 tubes) Yes

## 2021-06-23 NOTE — ED PROVIDER NOTE - CRITICAL CARE ATTENDING CONTRIBUTION TO CARE
The patient was seen immediately upon arrival due to a high probability of imminent or life-threatening deterioration secondary to COVID with hypoxia, which required my direct attention, intervention, and personal management at the bedside. I have personally provided critical care time exclusive of time spent on separately billable procedures. Time includes review of laboratory data, radiology results, discussion with consultants, discussion with the patient's family, and monitoring for potential decompensation.

## 2021-06-23 NOTE — H&P ADULT - NSHPPOAPULMEMBOLUS_GEN_A_CORE
Subjective:       Patient ID: Lesli Gong is a 68 y.o. female.    Chief Complaint: History of gastrointestinal stromal tumor (GIST); prod (white) cough x 2 days; and r ankle pain 10/10  Oncologic History:  Ms. Gong is a 68-year-old female with a diagnosis of type 1 neurofibromatosis, who was referred to see Dr. Camilo for evaluation of an abdominal mass found. 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 Final pathology from that revealed neurofibroma with atypia and invasion into the skeletal muscle rather than liposarcoma. She underwent a PET scan, which revealed a diffuse low-level activity as a surgical defect and a focus of high-level activity in or near a loop of the small bowel, probably   jejunum in the left upper quadrant. She was recommended to undergo CT scan for further evaluation of the bowel lesion, which she did on 05/12/2015 and that revealed a soft tissue mass in the left upper quadrant abutting a loop of the proximal jejunum, and she was referred to undergo surgery. She underwent exploratory laparotomy, lysis of adhesions and excision of two approximately 5   cm masses as well as small bowel resection with primary repair on 05/25/2015. Pathology revealed gastrointestinal stromal tumor 345, size range from 1 to 3.5 cm involving the small bowel. GIST subtype is mixed 1 mitotic rate per 50 high power fields. Necrosis is present in 30%, low-grade tumor, Ki67 is less than 1% in the tumor cells showing nuclear staining.  She did not want to do Gleevac. She is on surveillance.          HPI She comes in today to review her CT scans which reveals no evidence of recurrence. She notes fatigue. She fell on ice last week and notes that she hurt her leg. She has no fractures.      Review of Systems   Constitutional: Negative for appetite  change, fatigue and unexpected weight change.   HENT: Negative for mouth sores.    Eyes: Negative for visual disturbance.   Respiratory: Negative for cough and shortness of breath.    Cardiovascular: Negative for chest pain.   Gastrointestinal: Negative for abdominal pain and diarrhea.   Genitourinary: Negative for frequency.   Musculoskeletal: Negative for back pain.        Leg pain+   Skin: Negative for rash.   Neurological: Negative for headaches.   Hematological: Negative for adenopathy.   Psychiatric/Behavioral: The patient is not nervous/anxious.    All other systems reviewed and are negative.      Objective:      Physical Exam   Constitutional: She is oriented to person, place, and time. She appears well-developed and well-nourished.   HENT:   Mouth/Throat: No oropharyngeal exudate.   Cardiovascular: Normal rate and normal heart sounds.    Pulmonary/Chest: Effort normal and breath sounds normal. She has no wheezes.   Abdominal: Soft. Bowel sounds are normal. There is no tenderness.   Musculoskeletal: She exhibits no edema or tenderness.   Lymphadenopathy:     She has no cervical adenopathy.   Neurological: She is alert and oriented to person, place, and time. Coordination normal.   Skin: Skin is warm and dry. No rash noted.   Psychiatric: She has a normal mood and affect. Judgment and thought content normal.   Vitals reviewed.        LABS:  WBC   Date Value Ref Range Status   01/25/2018 3.86 (L) 3.90 - 12.70 K/uL Final     Hemoglobin   Date Value Ref Range Status   01/25/2018 14.3 12.0 - 16.0 g/dL Final     Hematocrit   Date Value Ref Range Status   01/25/2018 43.4 37.0 - 48.5 % Final     Platelets   Date Value Ref Range Status   01/25/2018 179 150 - 350 K/uL Final     Gran # (ANC)   Date Value Ref Range Status   01/25/2018 1.9 1.8 - 7.7 K/uL Final     Comment:     The ANC is based on a white cell differential from an   automated cell counter. It has not been microscopically   reviewed for the presence of  abnormal cells. Clinical   correlation is required.         Chemistry        Component Value Date/Time     01/25/2018 1604    K 4.4 01/25/2018 1604     01/25/2018 1604    CO2 22 (L) 01/25/2018 1604    BUN 36 (H) 01/25/2018 1604    CREATININE 0.9 01/25/2018 1604    GLU 87 01/25/2018 1604        Component Value Date/Time    CALCIUM 9.1 01/25/2018 1604    ALKPHOS 104 01/25/2018 1604    AST 26 01/25/2018 1604    ALT 24 01/25/2018 1604    BILITOT 0.4 01/25/2018 1604    ESTGFRAFRICA >60.0 01/25/2018 1604    EGFRNONAA >60.0 01/25/2018 1604          Assessment:       1. History of gastrointestinal stromal tumor (GIST)    2. Neurofibromatosis, type 1 (von Recklinghausen's disease)    3. Pain of right lower extremity        Plan:        1. She is doing well with no evidence of recurrenc e and will return in 6 months with labs and CT scans/  2. Stable  3. She requested some percocet due to pain from fall. Advised her to take Advil or Aleve but she notes stomach discomfort related to that . She notes tylenol is not helping. I gave her #5 tabs of percocet after discussing addiction potential. She understands and will use it sparingly.    Above care plan was discussed with patient and all questions were addressed to her satisfaction         no

## 2021-06-23 NOTE — H&P ADULT - NSHPSOCIALHISTORY_GEN_ALL_CORE
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

## 2021-06-23 NOTE — H&P ADULT - PROBLEM SELECTOR PLAN 5
H/o HTN on Amlodipine 10mg daily and Losartan-HCTZ 50-12.5mg daily.  - C/w home Amlodipine  - Restart home Losartan-HCTZ if creatinine remains stable

## 2021-06-23 NOTE — H&P ADULT - NSICDXFAMILYHX_GEN_ALL_CORE_FT
No pertinent family history in first degree relatives FAMILY HISTORY:  Mother  Still living? Unknown  FH: CHF (congestive heart failure), Age at diagnosis: Age Unknown  FH: diabetes mellitus, Age at diagnosis: Age Unknown

## 2021-06-24 DIAGNOSIS — R74.01 ELEVATION OF LEVELS OF LIVER TRANSAMINASE LEVELS: ICD-10-CM

## 2021-06-24 DIAGNOSIS — U07.1 COVID-19: ICD-10-CM

## 2021-06-24 DIAGNOSIS — K58.0 IRRITABLE BOWEL SYNDROME WITH DIARRHEA: ICD-10-CM

## 2021-06-24 DIAGNOSIS — I10 ESSENTIAL (PRIMARY) HYPERTENSION: ICD-10-CM

## 2021-06-24 DIAGNOSIS — Z29.9 ENCOUNTER FOR PROPHYLACTIC MEASURES, UNSPECIFIED: ICD-10-CM

## 2021-06-24 DIAGNOSIS — R79.89 OTHER SPECIFIED ABNORMAL FINDINGS OF BLOOD CHEMISTRY: ICD-10-CM

## 2021-06-24 LAB
A1C WITH ESTIMATED AVERAGE GLUCOSE RESULT: 5.9 % — HIGH (ref 4–5.6)
ALBUMIN SERPL ELPH-MCNC: 3.4 G/DL — SIGNIFICANT CHANGE UP (ref 3.3–5)
ALP SERPL-CCNC: 97 U/L — SIGNIFICANT CHANGE UP (ref 40–120)
ALT FLD-CCNC: 30 U/L — SIGNIFICANT CHANGE UP (ref 10–45)
ANION GAP SERPL CALC-SCNC: 12 MMOL/L — SIGNIFICANT CHANGE UP (ref 5–17)
AST SERPL-CCNC: 43 U/L — HIGH (ref 10–40)
BASOPHILS # BLD AUTO: 0 K/UL — SIGNIFICANT CHANGE UP (ref 0–0.2)
BASOPHILS NFR BLD AUTO: 0 % — SIGNIFICANT CHANGE UP (ref 0–2)
BILIRUB SERPL-MCNC: 0.4 MG/DL — SIGNIFICANT CHANGE UP (ref 0.2–1.2)
BUN SERPL-MCNC: 12 MG/DL — SIGNIFICANT CHANGE UP (ref 7–23)
CALCIUM SERPL-MCNC: 8.2 MG/DL — LOW (ref 8.4–10.5)
CHLORIDE SERPL-SCNC: 102 MMOL/L — SIGNIFICANT CHANGE UP (ref 96–108)
CO2 SERPL-SCNC: 25 MMOL/L — SIGNIFICANT CHANGE UP (ref 22–31)
CREAT SERPL-MCNC: 0.9 MG/DL — SIGNIFICANT CHANGE UP (ref 0.5–1.3)
CRP SERPL-MCNC: 83.3 MG/L — HIGH (ref 0–4)
CULTURE RESULTS: SIGNIFICANT CHANGE UP
EOSINOPHIL # BLD AUTO: 0 K/UL — SIGNIFICANT CHANGE UP (ref 0–0.5)
EOSINOPHIL NFR BLD AUTO: 0 % — SIGNIFICANT CHANGE UP (ref 0–6)
ESTIMATED AVERAGE GLUCOSE: 123 MG/DL — HIGH (ref 68–114)
FERRITIN SERPL-MCNC: 2811 NG/ML — HIGH (ref 30–400)
GLUCOSE BLDC GLUCOMTR-MCNC: 126 MG/DL — HIGH (ref 70–99)
GLUCOSE BLDC GLUCOMTR-MCNC: 141 MG/DL — HIGH (ref 70–99)
GLUCOSE BLDC GLUCOMTR-MCNC: 174 MG/DL — HIGH (ref 70–99)
GLUCOSE SERPL-MCNC: 142 MG/DL — HIGH (ref 70–99)
HCT VFR BLD CALC: 40.2 % — SIGNIFICANT CHANGE UP (ref 39–50)
HGB BLD-MCNC: 13.6 G/DL — SIGNIFICANT CHANGE UP (ref 13–17)
IMM GRANULOCYTES NFR BLD AUTO: 0.5 % — SIGNIFICANT CHANGE UP (ref 0–1.5)
LYMPHOCYTES # BLD AUTO: 0.55 K/UL — LOW (ref 1–3.3)
LYMPHOCYTES # BLD AUTO: 9.4 % — LOW (ref 13–44)
MAGNESIUM SERPL-MCNC: 2.3 MG/DL — SIGNIFICANT CHANGE UP (ref 1.6–2.6)
MCHC RBC-ENTMCNC: 30.4 PG — SIGNIFICANT CHANGE UP (ref 27–34)
MCHC RBC-ENTMCNC: 33.8 GM/DL — SIGNIFICANT CHANGE UP (ref 32–36)
MCV RBC AUTO: 89.9 FL — SIGNIFICANT CHANGE UP (ref 80–100)
MONOCYTES # BLD AUTO: 0.41 K/UL — SIGNIFICANT CHANGE UP (ref 0–0.9)
MONOCYTES NFR BLD AUTO: 7 % — SIGNIFICANT CHANGE UP (ref 2–14)
NEUTROPHILS # BLD AUTO: 4.87 K/UL — SIGNIFICANT CHANGE UP (ref 1.8–7.4)
NEUTROPHILS NFR BLD AUTO: 83.1 % — HIGH (ref 43–77)
NRBC # BLD: 0 /100 WBCS — SIGNIFICANT CHANGE UP (ref 0–0)
PHOSPHATE SERPL-MCNC: 1.8 MG/DL — LOW (ref 2.5–4.5)
PLATELET # BLD AUTO: 204 K/UL — SIGNIFICANT CHANGE UP (ref 150–400)
POTASSIUM SERPL-MCNC: 3.8 MMOL/L — SIGNIFICANT CHANGE UP (ref 3.5–5.3)
POTASSIUM SERPL-SCNC: 3.8 MMOL/L — SIGNIFICANT CHANGE UP (ref 3.5–5.3)
PROCALCITONIN SERPL-MCNC: 0.24 NG/ML — HIGH (ref 0.02–0.1)
PROT SERPL-MCNC: 6.7 G/DL — SIGNIFICANT CHANGE UP (ref 6–8.3)
RBC # BLD: 4.47 M/UL — SIGNIFICANT CHANGE UP (ref 4.2–5.8)
RBC # FLD: 13 % — SIGNIFICANT CHANGE UP (ref 10.3–14.5)
SODIUM SERPL-SCNC: 139 MMOL/L — SIGNIFICANT CHANGE UP (ref 135–145)
SPECIMEN SOURCE: SIGNIFICANT CHANGE UP
WBC # BLD: 5.86 K/UL — SIGNIFICANT CHANGE UP (ref 3.8–10.5)
WBC # FLD AUTO: 5.86 K/UL — SIGNIFICANT CHANGE UP (ref 3.8–10.5)

## 2021-06-24 PROCEDURE — 99291 CRITICAL CARE FIRST HOUR: CPT

## 2021-06-24 PROCEDURE — 71045 X-RAY EXAM CHEST 1 VIEW: CPT | Mod: 26

## 2021-06-24 RX ORDER — AMLODIPINE BESYLATE 2.5 MG/1
1 TABLET ORAL
Qty: 0 | Refills: 0 | DISCHARGE

## 2021-06-24 RX ORDER — TOCILIZUMAB 20 MG/ML
800 INJECTION, SOLUTION, CONCENTRATE INTRAVENOUS ONCE
Refills: 0 | Status: COMPLETED | OUTPATIENT
Start: 2021-06-24 | End: 2021-06-24

## 2021-06-24 RX ORDER — AMLODIPINE BESYLATE 2.5 MG/1
10 TABLET ORAL DAILY
Refills: 0 | Status: DISCONTINUED | OUTPATIENT
Start: 2021-06-24 | End: 2021-06-30

## 2021-06-24 RX ORDER — ENOXAPARIN SODIUM 100 MG/ML
40 INJECTION SUBCUTANEOUS EVERY 12 HOURS
Refills: 0 | Status: DISCONTINUED | OUTPATIENT
Start: 2021-06-24 | End: 2021-06-26

## 2021-06-24 RX ORDER — LOSARTAN/HYDROCHLOROTHIAZIDE 100MG-25MG
1 TABLET ORAL
Qty: 0 | Refills: 0 | DISCHARGE

## 2021-06-24 RX ORDER — CHLORHEXIDINE GLUCONATE 213 G/1000ML
1 SOLUTION TOPICAL
Refills: 0 | Status: DISCONTINUED | OUTPATIENT
Start: 2021-06-24 | End: 2021-06-28

## 2021-06-24 RX ADMIN — Medication 650 MILLIGRAM(S): at 05:30

## 2021-06-24 RX ADMIN — TOCILIZUMAB 100 MILLIGRAM(S): 20 INJECTION, SOLUTION, CONCENTRATE INTRAVENOUS at 09:54

## 2021-06-24 RX ADMIN — REMDESIVIR 200 MILLIGRAM(S): 5 INJECTION INTRAVENOUS at 00:40

## 2021-06-24 RX ADMIN — Medication 2: at 12:13

## 2021-06-24 RX ADMIN — Medication 6 MILLIGRAM(S): at 06:09

## 2021-06-24 RX ADMIN — AMLODIPINE BESYLATE 10 MILLIGRAM(S): 2.5 TABLET ORAL at 06:22

## 2021-06-24 RX ADMIN — CHLORHEXIDINE GLUCONATE 1 APPLICATION(S): 213 SOLUTION TOPICAL at 17:06

## 2021-06-24 RX ADMIN — Medication 650 MILLIGRAM(S): at 04:26

## 2021-06-24 RX ADMIN — Medication 650 MILLIGRAM(S): at 19:00

## 2021-06-24 RX ADMIN — PANTOPRAZOLE SODIUM 40 MILLIGRAM(S): 20 TABLET, DELAYED RELEASE ORAL at 06:21

## 2021-06-24 RX ADMIN — Medication 650 MILLIGRAM(S): at 18:46

## 2021-06-24 RX ADMIN — REMDESIVIR 500 MILLIGRAM(S): 5 INJECTION INTRAVENOUS at 22:19

## 2021-06-24 RX ADMIN — ENOXAPARIN SODIUM 40 MILLIGRAM(S): 100 INJECTION SUBCUTANEOUS at 14:45

## 2021-06-24 NOTE — PROGRESS NOTE ADULT - PROBLEM SELECTOR PLAN 1
Presenting w/ malaise, body aches, fevers x 1 week, now w/ hypoxia to 79% at home. SpO2 88% on room air in the ED. Pt w/ subjective dyspnea even walking short distances to the bathroom. Requiring 6LNC on admission.  - C/w supplemental O2 via nasal cannula to maintain SpO2 >88%  - Low threshold for ICU consult if O2 requirements increase to nonrebreather  - Management of COVID pneumonia as above Presenting w/ malaise, body aches, fevers x 1 week, now w/ hypoxia to 79% at home. SpO2 88% on room air in the ED. Pt w/ subjective dyspnea even walking short distances to the bathroom. Requiring 6LNC on admission.  - Hypoxic to 82% overnight and placed on NRB with improvement saturating to low 90% while on RMF, for which ICU was consulted, with decision for ICU transfer in setting of rapid progression of O2 needs  - Management of COVID pneumonia as below  - Continue supplemental O2

## 2021-06-24 NOTE — PROGRESS NOTE ADULT - ASSESSMENT
50YOM w/ PMH of HTN, IBS, Obesity, unvaccinated from Covid who presented for 1 week of hypoxic respiratory failure 2/2 covid, patient started on decadron and Remdesivir, now given Tocilizumab and transfered to MICU for increased O2 requirements       NEURO:  Awake alert no neurological issue at this time.     PULM:  #Acute Hypoxic respiratory failure secondary to COVID-19  Patient requiring increasing amounts of oxygen while on RMF, patient was sent to MICU for management of oxygen requirements Currently receiving Dexamethasone and Remdesevir.  - C/w close respiratory monitoring transition to HFNC in negative pressure room   - Continue Dexamethasone 6mg daily for 10 days, first day was 6/23  - Continue Remdesivir 100 daily for 4 days, first day 6/23   - Tocilizumab on 6/24    #Covid-19 PNA:   As above.     CARDIO:  #PMH of HTN:   At home on Amlodipine 10mg daily and Losartan-HCTZ 50-12.5mg daily. Currently Normotensive.   - Continue with Amlodipine   - Will reintroduce other antihypertensive when clinically appropriate     GI:  #PMH of IBS:  Currently constipated, no diarrhea bouts.  - Continue Miralax daily    RENAL:  #JANIS:   Patient presented with Cr 1.34 with improvement to 0.90 this AM s/p 2.5L NS, likely prerenal as etiology in setting of improvement.   - Follow up repeat creatinine level      ENDO:  No acute issue at this time.     ID:  #Viral PNA:   Patient with low grade temperature 100.1 but oral, and HR 100s initially, not technically 2/4 SIRS criteria, etiology 2/2 viral PNA (no lobar consolidation), UA neg, BCX/UCX obtained from ED.   - As above.     FENA:   F: None   E: Replete lytes PRN K>4, Mg>2  N: NPO  A: Lovenox    50YOM w/ PMH of HTN, IBS, Obesity, unvaccinated from Covid who presented for 1 week of hypoxic respiratory failure 2/2 covid pneumonia, patient started on decadron and Remdesivir, now given Tocilizumab and transfered to MICU for increased O2 requirements       NEURO:  Awake alert no neurological issue at this time.     PULM:  #Acute Hypoxic respiratory failure secondary to COVID-19  Patient requiring increasing amounts of oxygen while on RMF, patient was sent to MICU for management of oxygen requirements Currently receiving Dexamethasone and Remdesevir.  - C/w close respiratory monitoring transition to HFNC in negative pressure room   - Continue Dexamethasone 6mg daily for 10 days, first day was 6/23  - Continue Remdesivir 100 daily for 4 days, first day 6/23   - Tocilizumab on 6/24    #Covid-19 PNA:   As above.     CARDIO:  #PMH of HTN:   At home on Amlodipine 10mg daily and Losartan-HCTZ 50-12.5mg daily. Currently Normotensive.   - Continue with Amlodipine   - Will reintroduce other antihypertensive when clinically appropriate     GI:  #PMH of IBS:  Currently constipated, no diarrhea bouts.  - Continue Miralax daily    RENAL:  #JANIS:   Patient presented with Cr 1.34 with improvement to 0.90 this AM s/p 2.5L NS, likely prerenal as etiology in setting of improvement.   - Follow up repeat creatinine level      ENDO:  No acute issue at this time.     ID:  #Viral PNA:   Patient with low grade temperature 100.1 but oral, and HR 100s initially, not technically 2/4 SIRS criteria, etiology 2/2 viral PNA (no lobar consolidation), UA neg, BCX/UCX obtained from ED.   - As above.     FENA:   F: None   E: Replete lytes PRN K>4, Mg>2  N: NPO  A: Lovenox

## 2021-06-24 NOTE — PROGRESS NOTE ADULT - SUBJECTIVE AND OBJECTIVE BOX
*INCOMPLETE**     TRANSFER NOTE: RMF TO MICU     Hospital Course: 50 YOM w/ PMH of HTN, IBS, Obesity, unvaccinated from COVID who presented for 1 week of hypoxic respiratory failure 2/2 COVID. Symptoms started  with weakness, initially low grade fever, myalgias, loss of taste and smell, tested positive for Covid , with Tmax 101.9 and progression of GRECO, chest tightness, cough (dry mostly intermittently green and pulse ox reading lowest 79% at rest (up to 90% with deep inspiration), therefore in the setting of hypoxia he decided to come to ED. At ED he was 88%on RA, started on 6L NC saturating initially low 90s%, but subsequently became hypoxic to 82% overnight and placed on NRB with improvement saturating to low 90% while on RMF, for which ICU was consulted. Will transfer to ICU in setting of rapid progression of O2 needs.     Subjective/ROS: Admits to dyspnea with exertion. Denies HA, CP, SOB at rest, n/v, changes in bowel/urinary habits.   Family History, Social History, Past Medical History, and Home Medications from admission H&P were reviewed and are unchanged unless noted below.     VITALS  Vital Signs Last 24 Hrs  T(C): 37 (2021 08:43), Max: 37.8 (2021 09:12)  T(F): 98.6 (2021 08:43), Max: 100.1 (2021 09:12)  HR: 80 (2021 08:43) (80 - 104)  BP: 131/76 (2021 08:43) (100/60 - 146/80)  BP(mean): --  RR: 20 (2021 08:43) (16 - 20)  SpO2: 93% (2021 08:43) (82% - 98%)    CAPILLARY BLOOD GLUCOSE    POCT Blood Glucose.: 158 mg/dL (2021 21:47)    PHYSICAL EXAM  General: Obese man, awake and alert, mildly tachypneic on venturi mask   Head: NC/AT; PERRL; EOMI; MMM  Neck: Supple; no JVD  Respiratory: Mildly tachypneic on Venturi Teofilo. No work of breathing. Faint bilateral crackles   Cardiovascular: Regular rhythm/rate; S1/S2   Gastrointestinal: Soft; NTND; normoactive BS  Extremities: WWP; no edema/cyanosis  Neurological: AOX3    MEDICATIONS  (STANDING):  amLODIPine   Tablet 10 milliGRAM(s) Oral daily  dexAMETHasone     Tablet 6 milliGRAM(s) Oral every 24 hours  enoxaparin Injectable 40 milliGRAM(s) SubCutaneous every 24 hours  insulin lispro (ADMELOG) corrective regimen sliding scale   SubCutaneous three times a day before meals  insulin lispro (ADMELOG) corrective regimen sliding scale   SubCutaneous at bedtime  pantoprazole    Tablet 40 milliGRAM(s) Oral before breakfast  remdesivir  IVPB   IV Intermittent   remdesivir  IVPB 100 milliGRAM(s) IV Intermittent every 24 hours  tocilizumab IVPB 800 milliGRAM(s) IV Intermittent once    MEDICATIONS  (PRN):  acetaminophen   Tablet .. 650 milliGRAM(s) Oral every 6 hours PRN Temp greater or equal to 38C (100.4F), Mild Pain (1 - 3)  bismuth subsalicylate Liquid 30 milliLiter(s) Oral four times a day PRN Diarrhea      Allergy Status Unknown  dairy products (Diarrhea)      LABS                        13.6   5.86  )-----------( 204      ( 2021 06:16 )             40.2     06-24    139  |  102  |  12  ----------------------------<  142<H>  3.8   |  25  |  0.90    Ca    8.2<L>      2021 06:16  Phos  1.8     06-24  Mg     2.3     06-24    TPro  6.7  /  Alb  3.4  /  TBili  0.4  /  DBili  x   /  AST  43<H>  /  ALT  30  /  AlkPhos  97  06-24    PT/INR - ( 2021 09:58 )   PT: 14.1 sec;   INR: 1.21          PTT - ( 2021 09:58 )  PTT:32.6 sec  Urinalysis Basic - ( 2021 11:17 )    Color: Yellow / Appearance: Clear / S.010 / pH: x  Gluc: x / Ketone: 15 mg/dL  / Bili: NEGATIVE / Urobili: 0.2 E.U./dL   Blood: x / Protein: NEGATIVE mg/dL / Nitrite: NEGATIVE   Leuk Esterase: NEGATIVE / RBC: x / WBC x   Sq Epi: x / Non Sq Epi: x / Bacteria: x      CARDIAC MARKERS ( 2021 09:44 )  <0.017 ng/mL / x     / x     / x     / x            IMAGING/EKG/ETC  EKG:  Xray:  CT:  MRI:   INCOMPLETE    TRANSFER NOTE: RMF TO MICU     Hospital Course: 50 YOM w/ PMH of HTN, IBS, Obesity, unvaccinated from COVID who presented for 1 week of hypoxic respiratory failure 2/2 COVID. Symptoms started  with weakness, initially low grade fever, myalgias, loss of taste and smell, tested positive for Covid , with Tmax 101.9 and progression of GRECO, chest tightness, cough (dry mostly intermittently green and pulse ox reading lowest 79% at rest (up to 90% with deep inspiration), therefore in the setting of hypoxia he decided to come to ED. At ED he was 88%on RA, started on 6L NC saturating initially low 90s%, but subsequently became hypoxic to 82% overnight and placed on NRB with improvement saturating to low 90% while on RMF, for which ICU was consulted. Will transfer to ICU in setting of rapid progression of O2 needs.     Subjective/ROS: Admits to dyspnea with exertion. Denies HA, CP, SOB at rest, n/v, changes in bowel/urinary habits.   Family History, Social History, Past Medical History, and Home Medications from admission H&P were reviewed and are unchanged unless noted below.     VITALS  Vital Signs Last 24 Hrs  T(C): 37 (2021 08:43), Max: 37.8 (2021 09:12)  T(F): 98.6 (2021 08:43), Max: 100.1 (2021 09:12)  HR: 80 (2021 08:43) (80 - 104)  BP: 131/76 (2021 08:43) (100/60 - 146/80)  BP(mean): --  RR: 20 (2021 08:43) (16 - 20)  SpO2: 93% (2021 08:43) (82% - 98%)    CAPILLARY BLOOD GLUCOSE    POCT Blood Glucose.: 158 mg/dL (2021 21:47)    PHYSICAL EXAM  General: Obese man, awake and alert, mildly tachypneic on venturi mask   Head: NC/AT; PERRL; EOMI; MMM  Neck: Supple; no JVD  Respiratory: Mildly tachypneic on Venturi Teofilo. No work of breathing. Faint bilateral crackles   Cardiovascular: Regular rhythm/rate; S1/S2   Gastrointestinal: Soft; NTND; +BS  Extremities: WWP; no edema/cyanosis  Neurological: AOX3, no focal deficits     MEDICATIONS  (STANDING):  amLODIPine   Tablet 10 milliGRAM(s) Oral daily  dexAMETHasone     Tablet 6 milliGRAM(s) Oral every 24 hours  enoxaparin Injectable 40 milliGRAM(s) SubCutaneous every 24 hours  insulin lispro (ADMELOG) corrective regimen sliding scale   SubCutaneous three times a day before meals  insulin lispro (ADMELOG) corrective regimen sliding scale   SubCutaneous at bedtime  pantoprazole    Tablet 40 milliGRAM(s) Oral before breakfast  remdesivir  IVPB   IV Intermittent   remdesivir  IVPB 100 milliGRAM(s) IV Intermittent every 24 hours  tocilizumab IVPB 800 milliGRAM(s) IV Intermittent once    MEDICATIONS  (PRN):  acetaminophen   Tablet .. 650 milliGRAM(s) Oral every 6 hours PRN Temp greater or equal to 38C (100.4F), Mild Pain (1 - 3)  bismuth subsalicylate Liquid 30 milliLiter(s) Oral four times a day PRN Diarrhea      Allergy Status Unknown  dairy products (Diarrhea)      LABS                        13.6   5.86  )-----------( 204      ( 2021 06:16 )             40.2     06-24    139  |  102  |  12  ----------------------------<  142<H>  3.8   |  25  |  0.90    Ca    8.2<L>      2021 06:16  Phos  1.8     06-24  Mg     2.3     06-24    TPro  6.7  /  Alb  3.4  /  TBili  0.4  /  DBili  x   /  AST  43<H>  /  ALT  30  /  AlkPhos  97  06-24    PT/INR - ( 2021 09:58 )   PT: 14.1 sec;   INR: 1.21          PTT - ( 2021 09:58 )  PTT:32.6 sec  Urinalysis Basic - ( 2021 11:17 )    Color: Yellow / Appearance: Clear / S.010 / pH: x  Gluc: x / Ketone: 15 mg/dL  / Bili: NEGATIVE / Urobili: 0.2 E.U./dL   Blood: x / Protein: NEGATIVE mg/dL / Nitrite: NEGATIVE   Leuk Esterase: NEGATIVE / RBC: x / WBC x   Sq Epi: x / Non Sq Epi: x / Bacteria: x      CARDIAC MARKERS ( 2021 09:44 )  <0.017 ng/mL / x     / x     / x     / x          --Xray Chest 1 View AP/PA (21 @ 09:57) >    EXAM:  XR CHEST AP OR PA 1V                           PROCEDURE DATE:  2021        INTERPRETATION:  TECHNIQUE: Single portable view of the chest.    COMPARISON: 2020    CLINICAL HISTORY: Sepsis    FINDINGS:    Single frontal view of the chest demonstrates diffuse bilateral infiltrates. The cardiomediastinal silhouette is normal. No acute osseous abnormalities.    IMPRESSION: Multilobar pneumonia.      --CT Chest No Cont (21 @ 12:32)   Impression:    Moderate patchy predominantly peripheral groundglass airspace opacities throughout both lungs likely reflective of atypical/viral pneumonia such as Covid 19.           TRANSFER NOTE: RMF TO MICU     Hospital Course: 50 YOM w/ PMH of HTN, IBS, Obesity, unvaccinated from COVID who presented for 1 week of hypoxic respiratory failure 2/2 COVID. Symptoms started  with weakness, initially low grade fever, myalgias, loss of taste and smell, tested positive for Covid , with Tmax 101.9 and progression of GRECO, chest tightness, cough (dry mostly intermittently green and pulse ox reading lowest 79% at rest (up to 90% with deep inspiration), therefore in the setting of hypoxia he decided to come to ED. At ED he was 88%on RA, started on 6L NC saturating initially low 90s%, but subsequently became hypoxic to 82% overnight and placed on NRB with improvement saturating to low 90% while on RMF, for which ICU was consulted. Will transfer to ICU in setting of rapid progression of O2 needs.     Subjective/ROS: Admits to dyspnea with exertion. Denies HA, CP, SOB at rest, n/v, changes in bowel/urinary habits.   Family History, Social History, Past Medical History, and Home Medications from admission H&P were reviewed and are unchanged unless noted below.     VITALS  Vital Signs Last 24 Hrs  T(C): 37 (2021 08:43), Max: 37.8 (2021 09:12)  T(F): 98.6 (2021 08:43), Max: 100.1 (2021 09:12)  HR: 80 (2021 08:43) (80 - 104)  BP: 131/76 (2021 08:43) (100/60 - 146/80)  BP(mean): --  RR: 20 (2021 08:43) (16 - 20)  SpO2: 93% (2021 08:43) (82% - 98%)    CAPILLARY BLOOD GLUCOSE    POCT Blood Glucose.: 158 mg/dL (2021 21:47)    PHYSICAL EXAM  General: Obese man, awake and alert, mildly tachypneic on venturi mask   Head: NC/AT; PERRL; EOMI; MMM  Neck: Supple; no JVD  Respiratory: Mildly tachypneic on Venturi Teofilo. No work of breathing. Faint bilateral crackles   Cardiovascular: Regular rhythm/rate; S1/S2   Gastrointestinal: Soft; NTND; +BS  Extremities: WWP; no edema/cyanosis  Neurological: AOX3, no focal deficits     MEDICATIONS  (STANDING):  amLODIPine   Tablet 10 milliGRAM(s) Oral daily  dexAMETHasone     Tablet 6 milliGRAM(s) Oral every 24 hours  enoxaparin Injectable 40 milliGRAM(s) SubCutaneous every 24 hours  insulin lispro (ADMELOG) corrective regimen sliding scale   SubCutaneous three times a day before meals  insulin lispro (ADMELOG) corrective regimen sliding scale   SubCutaneous at bedtime  pantoprazole    Tablet 40 milliGRAM(s) Oral before breakfast  remdesivir  IVPB   IV Intermittent   remdesivir  IVPB 100 milliGRAM(s) IV Intermittent every 24 hours  tocilizumab IVPB 800 milliGRAM(s) IV Intermittent once    MEDICATIONS  (PRN):  acetaminophen   Tablet .. 650 milliGRAM(s) Oral every 6 hours PRN Temp greater or equal to 38C (100.4F), Mild Pain (1 - 3)  bismuth subsalicylate Liquid 30 milliLiter(s) Oral four times a day PRN Diarrhea      Allergy Status Unknown  dairy products (Diarrhea)      LABS                        13.6   5.86  )-----------( 204      ( 2021 06:16 )             40.2     06-24    139  |  102  |  12  ----------------------------<  142<H>  3.8   |  25  |  0.90    Ca    8.2<L>      2021 06:16  Phos  1.8     06-24  Mg     2.3     06-24    TPro  6.7  /  Alb  3.4  /  TBili  0.4  /  DBili  x   /  AST  43<H>  /  ALT  30  /  AlkPhos  97  06-24    PT/INR - ( 2021 09:58 )   PT: 14.1 sec;   INR: 1.21          PTT - ( 2021 09:58 )  PTT:32.6 sec  Urinalysis Basic - ( 2021 11:17 )    Color: Yellow / Appearance: Clear / S.010 / pH: x  Gluc: x / Ketone: 15 mg/dL  / Bili: NEGATIVE / Urobili: 0.2 E.U./dL   Blood: x / Protein: NEGATIVE mg/dL / Nitrite: NEGATIVE   Leuk Esterase: NEGATIVE / RBC: x / WBC x   Sq Epi: x / Non Sq Epi: x / Bacteria: x      CARDIAC MARKERS ( 2021 09:44 )  <0.017 ng/mL / x     / x     / x     / x          --Xray Chest 1 View AP/PA (21 @ 09:57) >    EXAM:  XR CHEST AP OR PA 1V                           PROCEDURE DATE:  2021        INTERPRETATION:  TECHNIQUE: Single portable view of the chest.    COMPARISON: 2020    CLINICAL HISTORY: Sepsis    FINDINGS:    Single frontal view of the chest demonstrates diffuse bilateral infiltrates. The cardiomediastinal silhouette is normal. No acute osseous abnormalities.    IMPRESSION: Multilobar pneumonia.      --CT Chest No Cont (21 @ 12:32)   Impression:    Moderate patchy predominantly peripheral groundglass airspace opacities throughout both lungs likely reflective of atypical/viral pneumonia such as Covid 19.

## 2021-06-24 NOTE — CONSULT NOTE ADULT - SUBJECTIVE AND OBJECTIVE BOX
ICU SERVICE CONSULTATION NOTE    CC: Hypoxia     HPI:  50YOM w/ PMH of HTN, IBS, Obesity, unvaccinated from Covid who presented for 1 week of hypoxic respiratory failure 2/2 covid. Symptoms started  with weakness, initially low grade fever, myalgias, loss of taste and smell, tested positive for Covid , with Tmax 101.9 and progression of GRECO, chest tightness, cough (dry mostly intermittently green and pulse ox reading lowest 79% at rest (up to 90% with deep inspiration) for w    presenting w/ hypoxia in setting of COVID+ x 1 week. Weekend of -, pt went to the [facility] in [place]. The following  he started feeling malaise then started feeling feverish with full body aches, loss of appetite and taste. Pt tested positive for COVID on . Over the course of the week his symptoms worsened w/ fevers up to 101.9F, then started having GRECO and associated mild intermittent mid substernal chest tightness. Pt purchased a pulse oximeter which read an SpO2 of 79% yesterday improved to 90% with deep breaths. Due to the hypoxia pt was advised by his PMD to go to the ED. Pt has not yet received the vaccination as he works alone and "just had not gotten around to it". No recent out-of-state or international travel. Pt's  whom he lives with also tested positive 2 days afterwards, has low grade fevers however is reported to be stable without hypoxia at home.  ROS additionally positive for intermittent productive cough w/ thick sputum, at times green-tinged.  Pt denies any SOB or CP at rest, abd pain, constipation, urinary symptoms, LE pain or swelling. Pt has chronic diarrhea 2/2 IBS he takes prn Bismuth subsalicylate for.    ED vitals: T 100.1F, /66, , RR 16, SpO2 88% on RA   ED labs s/f: Creatinine 1.34, AST 46, , COVID+  ED imaging: CXR w/ bilateral infiltrates, CT chest w/ moderate patchy predominantly peripheral groundglass airspace opacities throughout both lungs likely reflective of atypical/viral pneumonia such as Covid 19.  ED treatment: 2.5L NS, Ketorolac x2, Tylenol, Dexamethasone 10mg x1      ROS:  Otherwise negative, except as specified in HPI.    PMH:    PSH:    FH:    SH:    ALLERGIES:    MEDICATIONS:    VITAL SIGNS:  ICU Vital Signs Last 24 Hrs  T(C): 37.4 (2021 06:15), Max: 37.8 (2021 09:12)  T(F): 99.3 (2021 06:15), Max: 100.1 (2021 09:12)  HR: 92 (2021 06:15) (80 - 104)  BP: 135/72 (2021 06:15) (100/60 - 146/80)  BP(mean): --  ABP: --  ABP(mean): --  RR: 19 (2021 06:15) (16 - 20)  SpO2: 98% (2021 06:32) (82% - 98%)    CAPILLARY BLOOD GLUCOSE      POCT Blood Glucose.: 158 mg/dL (2021 21:47)      PHYSICAL EXAM:  Constitutional: resting comfortably in bed, NAD  HEENT: NC/AT; PERRL, anicteric sclera; no oropharyngeal erythema or exudates; MMM  Neck: supple, no appreciable JVD  Respiratory: CTA B/L, no W/R/R; respirations appear non-labored, conversive in full sentences  Cardiovascular: +S1/S2, RRR  Gastrointestinal: abdomen soft, NT/ND  Extremities: WWP; no clubbing, cyanosis or edema  Vascular: 2+ radial, femoral, and DP/PT pulses B/L  Dermatologic: skin normal color and turgor; no visible rashes  Neurological:     LABS:                        13.6   5.86  )-----------( 204      ( 2021 06:16 )             40.2     06-24    139  |  102  |  12  ----------------------------<  142<H>  3.8   |  25  |  0.90    Ca    8.2<L>      2021 06:16  Phos  1.8     06-24  Mg     2.3     06-24    TPro  6.7  /  Alb  3.4  /  TBili  0.4  /  DBili  x   /  AST  43<H>  /  ALT  30  /  AlkPhos  97  06-24    PT/INR - ( 2021 09:58 )   PT: 14.1 sec;   INR: 1.21          PTT - ( 2021 09:58 )  PTT:32.6 sec  Lactate, Blood: 1.4 mmoL/L (21 @ 09:44)    CARDIAC MARKERS ( 2021 09:44 )  <0.017 ng/mL / x     / x     / x     / x          Urinalysis Basic - ( 2021 11:17 )    Color: Yellow / Appearance: Clear / S.010 / pH: x  Gluc: x / Ketone: 15 mg/dL  / Bili: NEGATIVE / Urobili: 0.2 E.U./dL   Blood: x / Protein: NEGATIVE mg/dL / Nitrite: NEGATIVE   Leuk Esterase: NEGATIVE / RBC: x / WBC x   Sq Epi: x / Non Sq Epi: x / Bacteria: x          EKG: Reviewed.    RADIOLOGY & ADDITIONAL TESTS: Reviewed. ICU SERVICE CONSULTATION NOTE    CC: Hypoxia     HPI:  50YOM w/ PMH of HTN, IBS, Obesity, unvaccinated from Covid who presented for 1 week of hypoxic respiratory failure 2/2 covid. Symptoms started  with weakness, initially low grade fever, myalgias, loss of taste and smell, tested positive for Covid , with Tmax 101.9 and progression of GRECO, chest tightness, cough (dry mostly intermittently green and pulse ox reading lowest 79% at rest (up to 90% with deep inspiration), therefore in the setting of hypoxia he decided to come to ED. At ED he was 88%on RA, started on 6L NC saturating initially low 90s%, but subsequently became hypoxic to 82% and placed on NRB with improvement saturating to low 90% while on RMF, for which ICU was consulted.     ROS:  Otherwise negative, except as specified in HPI.    VITAL SIGNS:  ICU Vital Signs Last 24 Hrs  T(C): 37.4 (2021 06:15), Max: 37.8 (2021 09:12)  T(F): 99.3 (2021 06:15), Max: 100.1 (2021 09:12)  HR: 92 (2021 06:15) (80 - 104)  BP: 135/72 (2021 06:15) (100/60 - 146/80)  BP(mean): --  ABP: --  ABP(mean): --  RR: 19 (2021 06:15) (16 - 20)  SpO2: 98% (2021 06:32) (82% - 98%)    CAPILLARY BLOOD GLUCOSE      POCT Blood Glucose.: 158 mg/dL (2021 21:47)      PHYSICAL EXAM:  Constitutional: Overweight male in mild respiratory discomfort   HEENT: NC/AT;  MMM  Neck: supple  Respiratory: scattered fine crackles peripherally, speaking in full sentences, not using accessory muscles on NRB   Cardiovascular: +S1/S2, RRR no murmursd appreciated   Gastrointestinal: abdomen soft, NT/ND  Extremities: WWP; no shun  Vascular: 2+ radial pulses B/L  Dermatologic: skin normal   Neurological: AAOx3     LABS:                        13.6   5.86  )-----------( 204      ( 2021 06:16 )             40.2         139  |  102  |  12  ----------------------------<  142<H>  3.8   |  25  |  0.90    Ca    8.2<L>      2021 06:16  Phos  1.8       Mg     2.3         TPro  6.7  /  Alb  3.4  /  TBili  0.4  /  DBili  x   /  AST  43<H>  /  ALT  30  /  AlkPhos  97  -24    PT/INR - ( 2021 09:58 )   PT: 14.1 sec;   INR: 1.21          PTT - ( 2021 09:58 )  PTT:32.6 sec  Lactate, Blood: 1.4 mmoL/L (21 @ 09:44)    CARDIAC MARKERS ( 2021 09:44 )  <0.017 ng/mL / x     / x     / x     / x          Urinalysis Basic - ( 2021 11:17 )    Color: Yellow / Appearance: Clear / S.010 / pH: x  Gluc: x / Ketone: 15 mg/dL  / Bili: NEGATIVE / Urobili: 0.2 E.U./dL   Blood: x / Protein: NEGATIVE mg/dL / Nitrite: NEGATIVE   Leuk Esterase: NEGATIVE / RBC: x / WBC x   Sq Epi: x / Non Sq Epi: x / Bacteria: x          EKG: Reviewed.    RADIOLOGY & ADDITIONAL TESTS: Reviewed.

## 2021-06-24 NOTE — PROGRESS NOTE ADULT - ASSESSMENT
51 y/o M w/ PMHx of HTN, IBS, and obesity presenting w/ hypoxia in setting of COVID+ x 1 week. Admitted for acute hypoxemic respiratory failure 2/2 COVID pneumonia. 51 y/o M w/ PMHx of HTN, IBS, and obesity presenting w/ hypoxia in setting of COVID+ x 1 week. Admitted for acute hypoxemic respiratory failure 2/2 COVID pneumonia. Patient then became became hypoxic to 82% and placed on NRB with improvement saturating to low 90% while on RMF, for which ICU was consulted, with decision for ICU transfer in setting of rapid progression of O2 needs.

## 2021-06-24 NOTE — PROVIDER CONTACT NOTE (OTHER) - ACTION/TREATMENT ORDERED:
pt. now on non rebreather 15L, pt. placed on cardiac monitor per MD Munoz request. IV consulted for further care.

## 2021-06-24 NOTE — PROGRESS NOTE ADULT - PROBLEM SELECTOR PLAN 7
F: s/p IVF  E: replete prn  N: DASH/TLC  DVT ppx: Lovenox  GI ppx: PPI    Code: FULL  Dispo: [facility] F: s/p IVF  E: replete prn  N: DASH/TLC  DVT ppx: Lovenox  GI ppx: PPI    Code: FULL  [facility]

## 2021-06-24 NOTE — CONSULT NOTE ADULT - ASSESSMENT
50YOM w/ PMH of HTN, IBS, Obesity, unvaccinated from Covid who presented for 1 week of hypoxic respiratory failure 2/2 covid. Symptoms started 6/14 with weakness, initially low grade fever, myalgias, loss of taste and smell, tested positive for Covid 6/16, with Tmax 101.9 and progression of GRECO, chest tightness, cough (dry mostly intermittently green and pulse ox reading lowest 79% at rest (up to 90% with deep inspiration), therefore in the setting of hypoxia he decided to come to ED. At ED he was 88%on RA, started on 6L NC saturating initially low 90s%, but subsequently became hypoxic to 82% and placed on NRB with improvement saturating to low 90% while on RMF, for which ICU was consulted.       NEURO:    PULM:    CARDIO:    GI:    :    RENAL:    ENDO:    ID:    FENA:   F:  E: Replete lytes PRN K>4, Mg>2  N: NPO  A:     Code: FULL CODE    Dispo:  MICU. Plan discussed with Intensivist.  50YOM w/ PMH of HTN, IBS, Obesity, unvaccinated from Covid who presented for 1 week of hypoxic respiratory failure 2/2 covid. Symptoms started 6/14 with weakness, initially low grade fever, myalgias, loss of taste and smell, tested positive for Covid 6/16, with Tmax 101.9 and progression of GRECO, chest tightness, cough (dry mostly intermittently green and pulse ox reading lowest 79% at rest (up to 90% with deep inspiration), therefore in the setting of hypoxia he decided to come to ED. At ED he was 88%on RA, started on 6L NC saturating initially low 90s%, but subsequently became hypoxic to 82% and placed on NRB with improvement saturating to low 90% while on RMF, for which ICU was consulted, with decision for ICU transfer in setting of rapid progression of O2 needs.       NEURO:  Awake alert no neurological issue at this time.     PULM:  #Hypoxic respiratory failure:   At home reported 79% on RA, upon presentation 88% RA, improved initially to mid 90s% on 6L NC, with desaturation to 83% while on 6LNC, transitioned to NRB saturating 95s%. S/p dexamethason and remdesevir. CXR w/ b/l GGO progression/increase of infiltrates from admission to AM  CXR. CT chest multilobar PNA b/l.   - C/w close respiratory monitoring transition to HFNC in negative pressure room   - C/w dexamethasone 6mg qd for 10 days (6/23-)   - C/w Remdesivir 100qd for 4 days (6/23-)   - Start Tocilizumab (6/24-)  - C/w daily inflammatory markers (CRP, Ferritin)    #Covid-19 PNA:   As above.       CARDIO:  #PMH of HTN:   At home on Amlodipine 10mg daily and Losartan-HCTZ 50-12.5mg daily. Currently Normotensive.   - C/w home Amlodipine hold the other combination medication as normotensive and JANIS     GI:  #PMH of IBS:  Currently constipated, no diarrhea bouts.  - Start miralax daily.     RENAL:  #JANIS:   Patient presented with Cr 1.34 with improvement to 0.90 this AM s/p 2.5L NS, likely prerenal as etiology in setting of improvement.   - F/u repeat BMP qd  - Currently euvolemic no need for additional fluids at this time.     ENDO:  No acute issue at this time.     ID:  #Viral PNA:   Patient with low grade temperature 100.1 but oral, and HR 100s initially, not technically 2/4 SIRS criteria, etiology 2/2 viral PNA (no lobar consolidation), UA neg, BCX/UCX obtained from ED.   - As above.     FENA:   F: None   E: Replete lytes PRN K>4, Mg>2  N: NPO  A: Lovenox     Code: FULL CODE conversation held 6/24.     Dispo:  MICU. Plan discussed with Intensivist.

## 2021-06-24 NOTE — PROGRESS NOTE ADULT - SUBJECTIVE AND OBJECTIVE BOX
[facility]    Hospital Course: 50 YOM w/ PMH of HTN, IBS, Obesity, unvaccinated from COVID who presented for 1 week of hypoxic respiratory failure 2/2 COVID. Symptoms started  with weakness, initially low grade fever, myalgias, loss of taste and smell, tested positive for Covid , with Tmax 101.9 and progression of GRECO, chest tightness, cough (dry mostly intermittently green and pulse ox reading lowest 79% at rest (up to 90% with deep inspiration), therefore in the setting of hypoxia he decided to come to ED. At ED he was 88%on RA, started on 6L NC saturating initially low 90s%, but subsequently became hypoxic to 82% overnight and placed on NRB with improvement saturating to low 90% while on RMF, for which ICU was consulted. Patient transfered to ICU in setting of rapid progression of O2 needs.     INTERVAL HPI/OVERNIGHT EVENTS:    SUBJECTIVE: Patient staing that he has a productive cough, in which he occasionally produces a small amount of green sputum.  Patient states he feels short of breath, he denies any chest tightness or wheezing.     OBJECTIVE:    VITAL SIGNS:  ICU Vital Signs Last 24 Hrs  T(C): 36.6 (2021 14:28), Max: 37.9 (2021 09:50)  T(F): 97.8 (:28), Max: 100.2 (2021 09:50)  HR: 75 (2021 14:00) (74 - 92)  BP: 118/71 (2021 14:00) (115/57 - 146/80)  BP(mean): 90 (2021 14:00) (82 - 101)  ABP: --  ABP(mean): --  RR: 24 (2021 14:00) (10 - 35)  SpO2: 88% (2021 14:00) (82% - 98%)         @ 07:01  -   @ 15:44  --------------------------------------------------------  IN: 100 mL / OUT: 0 mL / NET: 100 mL      CAPILLARY BLOOD GLUCOSE      POCT Blood Glucose.: 174 mg/dL (2021 12:13)      PHYSICAL EXAM:    General: Overweight male, NAD, able to converse well on high flow  HEENT: NC/AT; PERRL, clear conjunctiva  Neck: supple  Respiratory: Crackles heard to auscultation bilaterally   Cardiovascular: +S1/S2; RRR  Abdomen: soft, NT/ND; +BS x4  Extremities: WWP, 2+ peripheral pulses b/l; no LE edema  Skin: normal color and turgor; no rash  Neurological: A&Ox3    MEDICATIONS:  MEDICATIONS  (STANDING):  amLODIPine   Tablet 10 milliGRAM(s) Oral daily  chlorhexidine 2% Cloths 1 Application(s) Topical <User Schedule>  dexAMETHasone     Tablet 6 milliGRAM(s) Oral every 24 hours  enoxaparin Injectable 40 milliGRAM(s) SubCutaneous every 12 hours  insulin lispro (ADMELOG) corrective regimen sliding scale   SubCutaneous three times a day before meals  insulin lispro (ADMELOG) corrective regimen sliding scale   SubCutaneous at bedtime  pantoprazole    Tablet 40 milliGRAM(s) Oral before breakfast  remdesivir  IVPB   IV Intermittent   remdesivir  IVPB 100 milliGRAM(s) IV Intermittent every 24 hours    MEDICATIONS  (PRN):  acetaminophen   Tablet .. 650 milliGRAM(s) Oral every 6 hours PRN Temp greater or equal to 38C (100.4F), Mild Pain (1 - 3)  bismuth subsalicylate Liquid 30 milliLiter(s) Oral four times a day PRN Diarrhea      ALLERGIES:  Allergies    Allergy Status Unknown    Intolerances    dairy products (Diarrhea)      LABS:                        13.6   5.86  )-----------( 204      ( 2021 06:16 )             40.2     06-24    139  |  102  |  12  ----------------------------<  142<H>  3.8   |  25  |  0.90    Ca    8.2<L>      2021 06:16  Phos  1.8     06-24  Mg     2.3     06-24    TPro  6.7  /  Alb  3.4  /  TBili  0.4  /  DBili  x   /  AST  43<H>  /  ALT  30  /  AlkPhos  97  06-24    PT/INR - ( 2021 09:58 )   PT: 14.1 sec;   INR: 1.21          PTT - ( 2021 09:58 )  PTT:32.6 sec  Urinalysis Basic - ( 2021 11:17 )    Color: Yellow / Appearance: Clear / S.010 / pH: x  Gluc: x / Ketone: 15 mg/dL  / Bili: NEGATIVE / Urobili: 0.2 E.U./dL   Blood: x / Protein: NEGATIVE mg/dL / Nitrite: NEGATIVE   Leuk Esterase: NEGATIVE / RBC: x / WBC x   Sq Epi: x / Non Sq Epi: x / Bacteria: x        RADIOLOGY & ADDITIONAL TESTS: Reviewed. [facility]    Hospital Course: 50 YOM w/ PMH of HTN, IBS, Obesity, unvaccinated from COVID who presented for 1 week of hypoxic respiratory failure 2/2 COVID. Symptoms started  with weakness, initially low grade fever, myalgias, loss of taste and smell, tested positive for Covid , with Tmax 101.9 and progression of GRECO, chest tightness, cough (dry mostly intermittently green and pulse ox reading lowest 79% at rest (up to 90% with deep inspiration), therefore in the setting of hypoxia he decided to come to ED. At ED he was 88%on RA, started on 6L NC saturating initially low 90s%, but subsequently became hypoxic to 82% overnight and placed on NRB with improvement saturating to low 90% while on RMF, for which ICU was consulted. Patient transfered to ICU in setting of rapid progression of O2 needs.     INTERVAL HPI/OVERNIGHT EVENTS:    SUBJECTIVE: Patient staing that he has a productive cough, in which he occasionally produces a small amount of green sputum.  Patient states he feels short of breath, he denies any chest tightness or wheezing.     OBJECTIVE:    VITAL SIGNS:  ICU Vital Signs Last 24 Hrs  T(C): 36.6 (2021 14:28), Max: 37.9 (2021 09:50)  T(F): 97.8 (:28), Max: 100.2 (2021 09:50)  HR: 75 (2021 14:00) (74 - 92)  BP: 118/71 (2021 14:00) (115/57 - 146/80)  BP(mean): 90 (2021 14:00) (82 - 101)  ABP: --  ABP(mean): --  RR: 24 (2021 14:00) (10 - 35)  SpO2: 88% (2021 14:00) (82% - 98%)         @ 07:01  -   @ 15:44  --------------------------------------------------------  IN: 100 mL / OUT: 0 mL / NET: 100 mL      CAPILLARY BLOOD GLUCOSE      POCT Blood Glucose.: 174 mg/dL (2021 12:13)      PHYSICAL EXAM:    General: Overweight male, NAD, able to converse well on high flow  HEENT: NC/AT; PERRL, clear conjunctiva  Neck: supple  Respiratory: Crackles heard to auscultation bilaterally   Cardiovascular: +S1/S2; RRR  Abdomen: soft, NT/ND; +BS x4  Extremities: WWP, 2+ peripheral pulses b/l; no LE edema  Skin: normal color and turgor; no rash  Neurological: A&Ox3  Vasc: 2+ pulses    MEDICATIONS:  MEDICATIONS  (STANDING):  amLODIPine   Tablet 10 milliGRAM(s) Oral daily  chlorhexidine 2% Cloths 1 Application(s) Topical <User Schedule>  dexAMETHasone     Tablet 6 milliGRAM(s) Oral every 24 hours  enoxaparin Injectable 40 milliGRAM(s) SubCutaneous every 12 hours  insulin lispro (ADMELOG) corrective regimen sliding scale   SubCutaneous three times a day before meals  insulin lispro (ADMELOG) corrective regimen sliding scale   SubCutaneous at bedtime  pantoprazole    Tablet 40 milliGRAM(s) Oral before breakfast  remdesivir  IVPB   IV Intermittent   remdesivir  IVPB 100 milliGRAM(s) IV Intermittent every 24 hours    MEDICATIONS  (PRN):  acetaminophen   Tablet .. 650 milliGRAM(s) Oral every 6 hours PRN Temp greater or equal to 38C (100.4F), Mild Pain (1 - 3)  bismuth subsalicylate Liquid 30 milliLiter(s) Oral four times a day PRN Diarrhea      ALLERGIES:  Allergies    Allergy Status Unknown    Intolerances    dairy products (Diarrhea)      LABS:                        13.6   5.86  )-----------( 204      ( 2021 06:16 )             40.2     06-24    139  |  102  |  12  ----------------------------<  142<H>  3.8   |  25  |  0.90    Ca    8.2<L>      2021 06:16  Phos  1.8     06-24  Mg     2.3     06-24    TPro  6.7  /  Alb  3.4  /  TBili  0.4  /  DBili  x   /  AST  43<H>  /  ALT  30  /  AlkPhos  97  06-24    PT/INR - ( 2021 09:58 )   PT: 14.1 sec;   INR: 1.21          PTT - ( 2021 09:58 )  PTT:32.6 sec  Urinalysis Basic - ( 2021 11:17 )    Color: Yellow / Appearance: Clear / S.010 / pH: x  Gluc: x / Ketone: 15 mg/dL  / Bili: NEGATIVE / Urobili: 0.2 E.U./dL   Blood: x / Protein: NEGATIVE mg/dL / Nitrite: NEGATIVE   Leuk Esterase: NEGATIVE / RBC: x / WBC x   Sq Epi: x / Non Sq Epi: x / Bacteria: x        RADIOLOGY & ADDITIONAL TESTS: Reviewed.

## 2021-06-25 LAB
ALBUMIN SERPL ELPH-MCNC: 3.6 G/DL — SIGNIFICANT CHANGE UP (ref 3.3–5)
ALP SERPL-CCNC: 92 U/L — SIGNIFICANT CHANGE UP (ref 40–120)
ALT FLD-CCNC: 34 U/L — SIGNIFICANT CHANGE UP (ref 10–45)
ANION GAP SERPL CALC-SCNC: 12 MMOL/L — SIGNIFICANT CHANGE UP (ref 5–17)
ANISOCYTOSIS BLD QL: SLIGHT — SIGNIFICANT CHANGE UP
AST SERPL-CCNC: 48 U/L — HIGH (ref 10–40)
BASOPHILS # BLD AUTO: 0 K/UL — SIGNIFICANT CHANGE UP (ref 0–0.2)
BASOPHILS NFR BLD AUTO: 0 % — SIGNIFICANT CHANGE UP (ref 0–2)
BILIRUB SERPL-MCNC: 0.4 MG/DL — SIGNIFICANT CHANGE UP (ref 0.2–1.2)
BUN SERPL-MCNC: 17 MG/DL — SIGNIFICANT CHANGE UP (ref 7–23)
BURR CELLS BLD QL SMEAR: PRESENT — SIGNIFICANT CHANGE UP
CALCIUM SERPL-MCNC: 8.4 MG/DL — SIGNIFICANT CHANGE UP (ref 8.4–10.5)
CHLORIDE SERPL-SCNC: 105 MMOL/L — SIGNIFICANT CHANGE UP (ref 96–108)
CO2 SERPL-SCNC: 24 MMOL/L — SIGNIFICANT CHANGE UP (ref 22–31)
CREAT SERPL-MCNC: 0.82 MG/DL — SIGNIFICANT CHANGE UP (ref 0.5–1.3)
EOSINOPHIL # BLD AUTO: 0 K/UL — SIGNIFICANT CHANGE UP (ref 0–0.5)
EOSINOPHIL NFR BLD AUTO: 0 % — SIGNIFICANT CHANGE UP (ref 0–6)
GIANT PLATELETS BLD QL SMEAR: PRESENT — SIGNIFICANT CHANGE UP
GLUCOSE BLDC GLUCOMTR-MCNC: 105 MG/DL — HIGH (ref 70–99)
GLUCOSE BLDC GLUCOMTR-MCNC: 128 MG/DL — HIGH (ref 70–99)
GLUCOSE BLDC GLUCOMTR-MCNC: 130 MG/DL — HIGH (ref 70–99)
GLUCOSE BLDC GLUCOMTR-MCNC: 186 MG/DL — HIGH (ref 70–99)
GLUCOSE SERPL-MCNC: 145 MG/DL — HIGH (ref 70–99)
HCT VFR BLD CALC: 43.3 % — SIGNIFICANT CHANGE UP (ref 39–50)
HGB BLD-MCNC: 14.6 G/DL — SIGNIFICANT CHANGE UP (ref 13–17)
LYMPHOCYTES # BLD AUTO: 0.71 K/UL — LOW (ref 1–3.3)
LYMPHOCYTES # BLD AUTO: 12.2 % — LOW (ref 13–44)
MAGNESIUM SERPL-MCNC: 2.3 MG/DL — SIGNIFICANT CHANGE UP (ref 1.6–2.6)
MANUAL SMEAR VERIFICATION: SIGNIFICANT CHANGE UP
MCHC RBC-ENTMCNC: 30.4 PG — SIGNIFICANT CHANGE UP (ref 27–34)
MCHC RBC-ENTMCNC: 33.7 GM/DL — SIGNIFICANT CHANGE UP (ref 32–36)
MCV RBC AUTO: 90.2 FL — SIGNIFICANT CHANGE UP (ref 80–100)
MONOCYTES # BLD AUTO: 0.25 K/UL — SIGNIFICANT CHANGE UP (ref 0–0.9)
MONOCYTES NFR BLD AUTO: 4.4 % — SIGNIFICANT CHANGE UP (ref 2–14)
NEUTROPHILS # BLD AUTO: 4.72 K/UL — SIGNIFICANT CHANGE UP (ref 1.8–7.4)
NEUTROPHILS NFR BLD AUTO: 81.7 % — HIGH (ref 43–77)
OVALOCYTES BLD QL SMEAR: SLIGHT — SIGNIFICANT CHANGE UP
PHOSPHATE SERPL-MCNC: 2.5 MG/DL — SIGNIFICANT CHANGE UP (ref 2.5–4.5)
PLAT MORPH BLD: ABNORMAL
PLATELET # BLD AUTO: 284 K/UL — SIGNIFICANT CHANGE UP (ref 150–400)
POIKILOCYTOSIS BLD QL AUTO: SLIGHT — SIGNIFICANT CHANGE UP
POLYCHROMASIA BLD QL SMEAR: SLIGHT — SIGNIFICANT CHANGE UP
POTASSIUM SERPL-MCNC: 3.8 MMOL/L — SIGNIFICANT CHANGE UP (ref 3.5–5.3)
POTASSIUM SERPL-SCNC: 3.8 MMOL/L — SIGNIFICANT CHANGE UP (ref 3.5–5.3)
PROT SERPL-MCNC: 7 G/DL — SIGNIFICANT CHANGE UP (ref 6–8.3)
RBC # BLD: 4.8 M/UL — SIGNIFICANT CHANGE UP (ref 4.2–5.8)
RBC # FLD: 13 % — SIGNIFICANT CHANGE UP (ref 10.3–14.5)
RBC BLD AUTO: ABNORMAL
SODIUM SERPL-SCNC: 141 MMOL/L — SIGNIFICANT CHANGE UP (ref 135–145)
SPHEROCYTES BLD QL SMEAR: SLIGHT — SIGNIFICANT CHANGE UP
VARIANT LYMPHS # BLD: 1.7 % — SIGNIFICANT CHANGE UP (ref 0–6)
WBC # BLD: 5.78 K/UL — SIGNIFICANT CHANGE UP (ref 3.8–10.5)
WBC # FLD AUTO: 5.78 K/UL — SIGNIFICANT CHANGE UP (ref 3.8–10.5)

## 2021-06-25 PROCEDURE — 99233 SBSQ HOSP IP/OBS HIGH 50: CPT | Mod: GC

## 2021-06-25 RX ADMIN — Medication 6 MILLIGRAM(S): at 12:13

## 2021-06-25 RX ADMIN — CHLORHEXIDINE GLUCONATE 1 APPLICATION(S): 213 SOLUTION TOPICAL at 06:29

## 2021-06-25 RX ADMIN — PANTOPRAZOLE SODIUM 40 MILLIGRAM(S): 20 TABLET, DELAYED RELEASE ORAL at 06:29

## 2021-06-25 RX ADMIN — Medication 0: at 21:33

## 2021-06-25 RX ADMIN — AMLODIPINE BESYLATE 10 MILLIGRAM(S): 2.5 TABLET ORAL at 06:30

## 2021-06-25 RX ADMIN — ENOXAPARIN SODIUM 40 MILLIGRAM(S): 100 INJECTION SUBCUTANEOUS at 16:14

## 2021-06-25 NOTE — DIETITIAN INITIAL EVALUATION ADULT. - NAME AND PHONE
Samantha Gitlin, RD, CDN, [facility], [phone] or available on [place]

## 2021-06-25 NOTE — DIETITIAN INITIAL EVALUATION ADULT. - WEIGHT FOR BMI (LBS)
238.1 Suspect related to recent increase in diuretics on top of CKD. Will cont. to monitor and obtain kidney imaging. Pt states he responded well to IV lasix given in ER.  -Trend BMP  -F/u kidney ultrasound  -Renal dose medications

## 2021-06-25 NOTE — DIETITIAN INITIAL EVALUATION ADULT. - ADD RECOMMEND
1. Encourage PO intake 2. Monitor lytes and replete prn. POC BG q6hrs while on decadron 3. Pain and bowel regimens per team 1. Encourage PO intake. Monitor for need for oral nutrition supplementation 2. Monitor lytes and replete prn. POC BG q6hrs while on decadron 3. Pain and bowel regimens per team

## 2021-06-25 NOTE — DIETITIAN INITIAL EVALUATION ADULT. - OTHER INFO
49 yo/male with PMHx HTN, IBS, presented with hypoxic respiratory failure 2/2 COVID. Pt hypoxic on NRB mask while on RMF, so stepped up to ICU. Pt seen and discussed during MICU rounds. Pt awake, alert, pleasant, breathing on. 49 yo/male with PMHx HTN, IBS, presented with hypoxic respiratory failure 2/2 COVID. Pt hypoxic on NRB mask while on RMF, so stepped up to ICU. S/p toci. Pt seen and discussed during MICU rounds. Pt awake, alert, pleasant, breathing on HFNC, satting in low 90s. Pt endorse usually eating well at home, follows GF/dairy free diet at home 2/2 IBS. Decreased intake for past few days 2/2 increased WOB and overall malaise. This AM he reported that his breathing is slightly labored. Appetite slowly improving, had ~30% of eggs and turkey anthony for breakfast. No N/V/C/D reported at this time (usually w/diarrhea 2/2 IBS). He denied difficulty chewing or swallowing. Skin intact w/o edema. Afebrile. Encouraged small, frequent meals w/focus on protein. Will continue to follow per RD protocol.

## 2021-06-25 NOTE — PROGRESS NOTE ADULT - SUBJECTIVE AND OBJECTIVE BOX
INTERVAL HPI/OVERNIGHT EVENTS: Self proned     SUBJECTIVE: Shortness of breath has not improved, and overall breathing feels the same as yesterday.  Patient is also complaining of chest tightness, no wheezing, no fevers, night sweats, or chills.     OBJECTIVE:    VITAL SIGNS:  ICU Vital Signs Last 24 Hrs  T(C): 36.1 (25 Jun 2021 14:00), Max: 37.1 (24 Jun 2021 22:37)  T(F): 97 (25 Jun 2021 14:00), Max: 98.7 (24 Jun 2021 22:37)  HR: 87 (25 Jun 2021 14:00) (62 - 97)  BP: 122/74 (25 Jun 2021 14:00) (107/58 - 152/83)  BP(mean): 93 (25 Jun 2021 14:00) (74 - 111)  ABP: --  ABP(mean): --  RR: 22 (25 Jun 2021 14:00) (17 - 39)  SpO2: 95% (25 Jun 2021 14:00) (86% - 97%)        06-24 @ 07:01  -  06-25 @ 07:00  --------------------------------------------------------  IN: 350 mL / OUT: 0 mL / NET: 350 mL      CAPILLARY BLOOD GLUCOSE      POCT Blood Glucose.: 105 mg/dL (25 Jun 2021 12:17)      PHYSICAL EXAM:    General: Well developed male, in NAD able to converse well while on high flow   HEENT: NC/AT; PERRL, clear conjunctiva, MM dry   Neck: supple  Respiratory: Crackles bilaterally right greater than left   Cardiovascular: +S1/S2; RRR  Abdomen: soft, NT/ND; +BS x4  Extremities: WWP, 2+ peripheral pulses b/l; no LE edema  Skin: normal color and turgor; no rash  Neurological: CN 2-12 grossly intact    MEDICATIONS:  MEDICATIONS  (STANDING):  amLODIPine   Tablet 10 milliGRAM(s) Oral daily  chlorhexidine 2% Cloths 1 Application(s) Topical <User Schedule>  dexAMETHasone     Tablet 6 milliGRAM(s) Oral every 24 hours  enoxaparin Injectable 40 milliGRAM(s) SubCutaneous every 12 hours  insulin lispro (ADMELOG) corrective regimen sliding scale   SubCutaneous three times a day before meals  insulin lispro (ADMELOG) corrective regimen sliding scale   SubCutaneous at bedtime  pantoprazole    Tablet 40 milliGRAM(s) Oral before breakfast  remdesivir  IVPB   IV Intermittent   remdesivir  IVPB 100 milliGRAM(s) IV Intermittent every 24 hours    MEDICATIONS  (PRN):  acetaminophen   Tablet .. 650 milliGRAM(s) Oral every 6 hours PRN Temp greater or equal to 38C (100.4F), Mild Pain (1 - 3)  bismuth subsalicylate Liquid 30 milliLiter(s) Oral four times a day PRN Diarrhea      ALLERGIES:  Allergies    Allergy Status Unknown    Intolerances    dairy products (Diarrhea)      LABS:                        14.6   5.78  )-----------( 284      ( 25 Jun 2021 06:14 )             43.3     06-25    141  |  105  |  17  ----------------------------<  145<H>  3.8   |  24  |  0.82    Ca    8.4      25 Jun 2021 06:14  Phos  2.5     06-25  Mg     2.3     06-25    TPro  7.0  /  Alb  3.6  /  TBili  0.4  /  DBili  x   /  AST  48<H>  /  ALT  34  /  AlkPhos  92  06-25          RADIOLOGY & ADDITIONAL TESTS: Reviewed.

## 2021-06-25 NOTE — PROGRESS NOTE ADULT - ASSESSMENT
50YOM w/ PMH of HTN, IBS, Obesity, unvaccinated from Covid who presented for 1 week of hypoxic respiratory failure 2/2 covid pneumonia, patient started on decadron and Remdesivir, now given Tocilizumab and transfered to MICU for increased O2 requirements       NEURO:  Awake alert no neurological issue at this time.     PULM:  #Acute Hypoxic respiratory failure secondary to COVID-19  Patient was sent to MICU for management of oxygen requirements.  Patient remains on high flow today with no increase in O2 demands.     - Continue with HFNC in negative pressure room   - Continue Dexamethasone 6mg daily for 10 days, first day was 6/23  - Continue Remdesivir 100 daily for 4 days, first day 6/23   - Tocilizumab on 6/24  - Out of bed to chair   - Prone position during day and night  - Sliding Scale while on steroids       CARDIO:  #PMH of HTN:   At home on Amlodipine 10mg daily and Losartan-HCTZ 50-12.5mg daily. Currently blood pressure in good ranges.   - Continue with Amlodipine   - Will reintroduce other antihypertensive when clinically appropriate     GI:  #PMH of IBS:  Currently constipated.  - Continue Miralax daily     RENAL:  #JANIS - Now Resolved   Patient presented with Cr 1.34 with improvement to 0.90 this , likely prerenal as etiology in setting of improvement.   - Follow up repeat creatinine level      ENDO:  No acute issue at this time.     ID:  #COVID-19 pneumonia management as above       FENA:   F: None   E: Replete lytes PRN K>4, Mg>2  N: DASH/TLC   A: Lovenox

## 2021-06-26 LAB
ALBUMIN SERPL ELPH-MCNC: 3.7 G/DL — SIGNIFICANT CHANGE UP (ref 3.3–5)
ALP SERPL-CCNC: 96 U/L — SIGNIFICANT CHANGE UP (ref 40–120)
ALT FLD-CCNC: 41 U/L — SIGNIFICANT CHANGE UP (ref 10–45)
ANION GAP SERPL CALC-SCNC: 10 MMOL/L — SIGNIFICANT CHANGE UP (ref 5–17)
ANISOCYTOSIS BLD QL: SLIGHT — SIGNIFICANT CHANGE UP
AST SERPL-CCNC: 46 U/L — HIGH (ref 10–40)
BASOPHILS # BLD AUTO: 0 K/UL — SIGNIFICANT CHANGE UP (ref 0–0.2)
BASOPHILS NFR BLD AUTO: 0 % — SIGNIFICANT CHANGE UP (ref 0–2)
BILIRUB SERPL-MCNC: 0.4 MG/DL — SIGNIFICANT CHANGE UP (ref 0.2–1.2)
BUN SERPL-MCNC: 17 MG/DL — SIGNIFICANT CHANGE UP (ref 7–23)
BURR CELLS BLD QL SMEAR: PRESENT — SIGNIFICANT CHANGE UP
CALCIUM SERPL-MCNC: 8.4 MG/DL — SIGNIFICANT CHANGE UP (ref 8.4–10.5)
CHLORIDE SERPL-SCNC: 104 MMOL/L — SIGNIFICANT CHANGE UP (ref 96–108)
CO2 SERPL-SCNC: 24 MMOL/L — SIGNIFICANT CHANGE UP (ref 22–31)
CREAT SERPL-MCNC: 0.8 MG/DL — SIGNIFICANT CHANGE UP (ref 0.5–1.3)
EOSINOPHIL # BLD AUTO: 0 K/UL — SIGNIFICANT CHANGE UP (ref 0–0.5)
EOSINOPHIL NFR BLD AUTO: 0 % — SIGNIFICANT CHANGE UP (ref 0–6)
GIANT PLATELETS BLD QL SMEAR: PRESENT — SIGNIFICANT CHANGE UP
GLUCOSE BLDC GLUCOMTR-MCNC: 113 MG/DL — HIGH (ref 70–99)
GLUCOSE BLDC GLUCOMTR-MCNC: 119 MG/DL — HIGH (ref 70–99)
GLUCOSE BLDC GLUCOMTR-MCNC: 137 MG/DL — HIGH (ref 70–99)
GLUCOSE BLDC GLUCOMTR-MCNC: 145 MG/DL — HIGH (ref 70–99)
GLUCOSE SERPL-MCNC: 128 MG/DL — HIGH (ref 70–99)
HCT VFR BLD CALC: 43.7 % — SIGNIFICANT CHANGE UP (ref 39–50)
HGB BLD-MCNC: 14.8 G/DL — SIGNIFICANT CHANGE UP (ref 13–17)
LYMPHOCYTES # BLD AUTO: 0.37 K/UL — LOW (ref 1–3.3)
LYMPHOCYTES # BLD AUTO: 6.2 % — LOW (ref 13–44)
MAGNESIUM SERPL-MCNC: 2.4 MG/DL — SIGNIFICANT CHANGE UP (ref 1.6–2.6)
MANUAL SMEAR VERIFICATION: SIGNIFICANT CHANGE UP
MCHC RBC-ENTMCNC: 30.5 PG — SIGNIFICANT CHANGE UP (ref 27–34)
MCHC RBC-ENTMCNC: 33.9 GM/DL — SIGNIFICANT CHANGE UP (ref 32–36)
MCV RBC AUTO: 89.9 FL — SIGNIFICANT CHANGE UP (ref 80–100)
MICROCYTES BLD QL: SLIGHT — SIGNIFICANT CHANGE UP
MONOCYTES # BLD AUTO: 0.53 K/UL — SIGNIFICANT CHANGE UP (ref 0–0.9)
MONOCYTES NFR BLD AUTO: 8.8 % — SIGNIFICANT CHANGE UP (ref 2–14)
MYELOCYTES NFR BLD: 0.9 % — HIGH (ref 0–0)
NEUTROPHILS # BLD AUTO: 5.05 K/UL — SIGNIFICANT CHANGE UP (ref 1.8–7.4)
NEUTROPHILS NFR BLD AUTO: 84.1 % — HIGH (ref 43–77)
OVALOCYTES BLD QL SMEAR: SLIGHT — SIGNIFICANT CHANGE UP
PHOSPHATE SERPL-MCNC: 3 MG/DL — SIGNIFICANT CHANGE UP (ref 2.5–4.5)
PLAT MORPH BLD: ABNORMAL
PLATELET # BLD AUTO: 326 K/UL — SIGNIFICANT CHANGE UP (ref 150–400)
POIKILOCYTOSIS BLD QL AUTO: SLIGHT — SIGNIFICANT CHANGE UP
POLYCHROMASIA BLD QL SMEAR: SLIGHT — SIGNIFICANT CHANGE UP
POTASSIUM SERPL-MCNC: 4 MMOL/L — SIGNIFICANT CHANGE UP (ref 3.5–5.3)
POTASSIUM SERPL-SCNC: 4 MMOL/L — SIGNIFICANT CHANGE UP (ref 3.5–5.3)
PROT SERPL-MCNC: 6.7 G/DL — SIGNIFICANT CHANGE UP (ref 6–8.3)
RBC # BLD: 4.86 M/UL — SIGNIFICANT CHANGE UP (ref 4.2–5.8)
RBC # FLD: 12.8 % — SIGNIFICANT CHANGE UP (ref 10.3–14.5)
RBC BLD AUTO: ABNORMAL
SCHISTOCYTES BLD QL AUTO: SLIGHT — SIGNIFICANT CHANGE UP
SMUDGE CELLS # BLD: PRESENT — SIGNIFICANT CHANGE UP
SODIUM SERPL-SCNC: 138 MMOL/L — SIGNIFICANT CHANGE UP (ref 135–145)
WBC # BLD: 6.01 K/UL — SIGNIFICANT CHANGE UP (ref 3.8–10.5)
WBC # FLD AUTO: 6.01 K/UL — SIGNIFICANT CHANGE UP (ref 3.8–10.5)

## 2021-06-26 PROCEDURE — 99291 CRITICAL CARE FIRST HOUR: CPT

## 2021-06-26 RX ORDER — ENOXAPARIN SODIUM 100 MG/ML
40 INJECTION SUBCUTANEOUS EVERY 12 HOURS
Refills: 0 | Status: DISCONTINUED | OUTPATIENT
Start: 2021-06-27 | End: 2021-06-30

## 2021-06-26 RX ADMIN — CHLORHEXIDINE GLUCONATE 1 APPLICATION(S): 213 SOLUTION TOPICAL at 05:45

## 2021-06-26 RX ADMIN — ENOXAPARIN SODIUM 40 MILLIGRAM(S): 100 INJECTION SUBCUTANEOUS at 13:59

## 2021-06-26 RX ADMIN — REMDESIVIR 500 MILLIGRAM(S): 5 INJECTION INTRAVENOUS at 23:57

## 2021-06-26 RX ADMIN — ENOXAPARIN SODIUM 40 MILLIGRAM(S): 100 INJECTION SUBCUTANEOUS at 03:32

## 2021-06-26 RX ADMIN — AMLODIPINE BESYLATE 10 MILLIGRAM(S): 2.5 TABLET ORAL at 05:45

## 2021-06-26 RX ADMIN — REMDESIVIR 500 MILLIGRAM(S): 5 INJECTION INTRAVENOUS at 00:24

## 2021-06-26 RX ADMIN — Medication 6 MILLIGRAM(S): at 09:15

## 2021-06-26 RX ADMIN — PANTOPRAZOLE SODIUM 40 MILLIGRAM(S): 20 TABLET, DELAYED RELEASE ORAL at 07:10

## 2021-06-26 NOTE — PROGRESS NOTE ADULT - ASSESSMENT
50YOM w/ PMH of HTN, IBS, Obesity, unvaccinated from Covid who presented for 1 week of hypoxic respiratory failure 2/2 covid pneumonia, patient started on decadron and Remdesivir, now given Tocilizumab and transfered to MICU for increased O2 requirements       NEURO:  Awake alert no neurological issue at this time.     PULM:  #Acute Hypoxic respiratory failure secondary to COVID-19  Patient was sent to MICU for management of oxygen requirements.  Patient remains on high flow today with no increase in O2 demands.     - Continue with HFNC in negative pressure room   - Continue Dexamethasone 6mg daily for 10 days, first day was 6/23  - Continue Remdesivir 100 daily for 4 days, first day 6/23   - Tocilizumab on 6/24  - Out of bed to chair   - Prone position during day and night  - Sliding Scale while on steroids   - Held CPAP today 6/26    CARDIO:  #PMH of HTN:   At home on Amlodipine 10mg daily and Losartan-HCTZ 50-12.5mg daily. Currently blood pressure in good ranges.   - Continue with Amlodipine   - Will reintroduce other antihypertensive when clinically appropriate     GI:  #PMH of IBS:  Currently constipated.  - Continue Miralax daily     RENAL:  #JANIS - Now Resolved   Patient presented with Cr 1.34 with improvement to 0.90 this , likely prerenal as etiology in setting of improvement.   - Follow up repeat creatinine level      ENDO:  No acute issue at this time.     ID:  #COVID-19 pneumonia management as above       FENA:   F: None   E: Replete lytes PRN K>4, Mg>2  N: DASH/TLC   A: Lovenox

## 2021-06-26 NOTE — PROGRESS NOTE ADULT - SUBJECTIVE AND OBJECTIVE BOX
INTERVAL HPI/OVERNIGHT EVENTS: ONIEL    SUBJECTIVE: Patient seen and examined at bedside. SOB improving.     OBJECTIVE:    VITAL SIGNS:  ICU Vital Signs Last 24 Hrs  T(C): 36.1 (26 Jun 2021 09:10), Max: 36.8 (26 Jun 2021 01:04)  T(F): 97 (26 Jun 2021 09:10), Max: 98.2 (26 Jun 2021 01:04)  HR: 85 (26 Jun 2021 14:00) (55 - 94)  BP: 147/84 (26 Jun 2021 14:00) (120/75 - 157/86)  BP(mean): 107 (26 Jun 2021 14:00) (91 - 120)  ABP: --  ABP(mean): --  RR: 20 (26 Jun 2021 14:00) (15 - 26)  SpO2: 94% (26 Jun 2021 14:00) (83% - 100%)        06-25 @ 07:01  -  06-26 @ 07:00  --------------------------------------------------------  IN: 370 mL / OUT: 800 mL / NET: -430 mL    06-26 @ 07:01  -  06-26 @ 14:09  --------------------------------------------------------  IN: 0 mL / OUT: 600 mL / NET: -600 mL      CAPILLARY BLOOD GLUCOSE      POCT Blood Glucose.: 113 mg/dL (26 Jun 2021 10:34)      PHYSICAL EXAM:    General: Well developed male, in NAD able to converse well while on high flow   HEENT: NC/AT; PERRL, clear conjunctiva, MM dry   Neck: supple  Respiratory: Crackles bilaterally right greater than left   Cardiovascular: +S1/S2; RRR  Abdomen: soft, NT/ND; +BS x4  Extremities: WWP, 2+ peripheral pulses b/l; no LE edema  Skin: normal color and turgor; no rash  Neurological: CN 2-12 grossly intact    MEDICATIONS:  MEDICATIONS  (STANDING):  amLODIPine   Tablet 10 milliGRAM(s) Oral daily  chlorhexidine 2% Cloths 1 Application(s) Topical <User Schedule>  dexAMETHasone     Tablet 6 milliGRAM(s) Oral every 24 hours  enoxaparin Injectable 40 milliGRAM(s) SubCutaneous every 12 hours  insulin lispro (ADMELOG) corrective regimen sliding scale   SubCutaneous three times a day before meals  insulin lispro (ADMELOG) corrective regimen sliding scale   SubCutaneous at bedtime  pantoprazole    Tablet 40 milliGRAM(s) Oral before breakfast  remdesivir  IVPB   IV Intermittent   remdesivir  IVPB 100 milliGRAM(s) IV Intermittent every 24 hours    MEDICATIONS  (PRN):  acetaminophen   Tablet .. 650 milliGRAM(s) Oral every 6 hours PRN Temp greater or equal to 38C (100.4F), Mild Pain (1 - 3)  bismuth subsalicylate Liquid 30 milliLiter(s) Oral four times a day PRN Diarrhea      ALLERGIES:  Allergies    Allergy Status Unknown    Intolerances    dairy products (Diarrhea)      LABS:                        14.8   6.01  )-----------( 326      ( 26 Jun 2021 06:45 )             43.7     06-26    138  |  104  |  17  ----------------------------<  128<H>  4.0   |  24  |  0.80    Ca    8.4      26 Jun 2021 06:45  Phos  3.0     06-26  Mg     2.4     06-26    TPro  6.7  /  Alb  3.7  /  TBili  0.4  /  DBili  x   /  AST  46<H>  /  ALT  41  /  AlkPhos  96  06-26          Procalcitonin: Procalcitonin, Serum: 0.24 ng/mL (06-24-21 @ 06:16)  CRP: C-Reactive Protein, Serum: 83.3 mg/L (06-24-21 @ 06:16)  Ferritin: Ferritin, Serum: 2811 ng/mL (06-24-21 @ 06:16)    Culture - Blood (collected 06-23-21 @ 18:32)  Source: .Blood Blood-Peripheral  Preliminary Report (06-24-21 @ 19:01):    No growth to date.    Culture - Blood (collected 06-23-21 @ 18:32)  Source: .Blood Blood-Peripheral  Preliminary Report (06-24-21 @ 19:01):    No growth to date.            RADIOLOGY & ADDITIONAL TESTS: Reviewed.

## 2021-06-27 LAB
ANION GAP SERPL CALC-SCNC: 11 MMOL/L — SIGNIFICANT CHANGE UP (ref 5–17)
BUN SERPL-MCNC: 19 MG/DL — SIGNIFICANT CHANGE UP (ref 7–23)
CALCIUM SERPL-MCNC: 8.4 MG/DL — SIGNIFICANT CHANGE UP (ref 8.4–10.5)
CHLORIDE SERPL-SCNC: 104 MMOL/L — SIGNIFICANT CHANGE UP (ref 96–108)
CO2 SERPL-SCNC: 24 MMOL/L — SIGNIFICANT CHANGE UP (ref 22–31)
CREAT SERPL-MCNC: 0.85 MG/DL — SIGNIFICANT CHANGE UP (ref 0.5–1.3)
GLUCOSE BLDC GLUCOMTR-MCNC: 110 MG/DL — HIGH (ref 70–99)
GLUCOSE BLDC GLUCOMTR-MCNC: 114 MG/DL — HIGH (ref 70–99)
GLUCOSE BLDC GLUCOMTR-MCNC: 124 MG/DL — HIGH (ref 70–99)
GLUCOSE BLDC GLUCOMTR-MCNC: 146 MG/DL — HIGH (ref 70–99)
GLUCOSE SERPL-MCNC: 113 MG/DL — HIGH (ref 70–99)
HCT VFR BLD CALC: 41.9 % — SIGNIFICANT CHANGE UP (ref 39–50)
HGB BLD-MCNC: 14.3 G/DL — SIGNIFICANT CHANGE UP (ref 13–17)
MAGNESIUM SERPL-MCNC: 2.3 MG/DL — SIGNIFICANT CHANGE UP (ref 1.6–2.6)
MCHC RBC-ENTMCNC: 30.3 PG — SIGNIFICANT CHANGE UP (ref 27–34)
MCHC RBC-ENTMCNC: 34.1 GM/DL — SIGNIFICANT CHANGE UP (ref 32–36)
MCV RBC AUTO: 88.8 FL — SIGNIFICANT CHANGE UP (ref 80–100)
NRBC # BLD: 0 /100 WBCS — SIGNIFICANT CHANGE UP (ref 0–0)
PHOSPHATE SERPL-MCNC: 2.6 MG/DL — SIGNIFICANT CHANGE UP (ref 2.5–4.5)
PLATELET # BLD AUTO: 380 K/UL — SIGNIFICANT CHANGE UP (ref 150–400)
POTASSIUM SERPL-MCNC: 4.1 MMOL/L — SIGNIFICANT CHANGE UP (ref 3.5–5.3)
POTASSIUM SERPL-SCNC: 4.1 MMOL/L — SIGNIFICANT CHANGE UP (ref 3.5–5.3)
RBC # BLD: 4.72 M/UL — SIGNIFICANT CHANGE UP (ref 4.2–5.8)
RBC # FLD: 12.5 % — SIGNIFICANT CHANGE UP (ref 10.3–14.5)
SODIUM SERPL-SCNC: 139 MMOL/L — SIGNIFICANT CHANGE UP (ref 135–145)
WBC # BLD: 6.87 K/UL — SIGNIFICANT CHANGE UP (ref 3.8–10.5)
WBC # FLD AUTO: 6.87 K/UL — SIGNIFICANT CHANGE UP (ref 3.8–10.5)

## 2021-06-27 PROCEDURE — 99233 SBSQ HOSP IP/OBS HIGH 50: CPT | Mod: GC

## 2021-06-27 RX ADMIN — Medication 6 MILLIGRAM(S): at 09:02

## 2021-06-27 RX ADMIN — ENOXAPARIN SODIUM 40 MILLIGRAM(S): 100 INJECTION SUBCUTANEOUS at 23:03

## 2021-06-27 RX ADMIN — CHLORHEXIDINE GLUCONATE 1 APPLICATION(S): 213 SOLUTION TOPICAL at 06:45

## 2021-06-27 RX ADMIN — REMDESIVIR 500 MILLIGRAM(S): 5 INJECTION INTRAVENOUS at 22:28

## 2021-06-27 RX ADMIN — ENOXAPARIN SODIUM 40 MILLIGRAM(S): 100 INJECTION SUBCUTANEOUS at 11:55

## 2021-06-27 RX ADMIN — PANTOPRAZOLE SODIUM 40 MILLIGRAM(S): 20 TABLET, DELAYED RELEASE ORAL at 06:45

## 2021-06-27 RX ADMIN — AMLODIPINE BESYLATE 10 MILLIGRAM(S): 2.5 TABLET ORAL at 06:45

## 2021-06-27 NOTE — PROGRESS NOTE ADULT - SUBJECTIVE AND OBJECTIVE BOX
INTERVAL HPI/OVERNIGHT EVENTS: Proned overnight     SUBJECTIVE: Feeling better, breathing has improved, slight cough, no chest tightness or wheezing.   OBJECTIVE:    VITAL SIGNS:  ICU Vital Signs Last 24 Hrs  T(C): 36.9 (27 Jun 2021 14:00), Max: 37.1 (26 Jun 2021 23:00)  T(F): 98.5 (27 Jun 2021 14:00), Max: 98.7 (26 Jun 2021 23:00)  HR: 73 (27 Jun 2021 14:00) (50 - 80)  BP: 143/78 (27 Jun 2021 14:00) (121/72 - 152/89)  BP(mean): 103 (27 Jun 2021 14:00) (88 - 117)  ABP: --  ABP(mean): --  RR: 20 (27 Jun 2021 14:00) (12 - 20)  SpO2: 93% (27 Jun 2021 14:00) (91% - 99%)        06-26 @ 07:01 - 06-27 @ 07:00  --------------------------------------------------------  IN: 250 mL / OUT: 2050 mL / NET: -1800 mL    06-27 @ 07:01  -  06-27 @ 14:50  --------------------------------------------------------  IN: 0 mL / OUT: 300 mL / NET: -300 mL      CAPILLARY BLOOD GLUCOSE      POCT Blood Glucose.: 114 mg/dL (27 Jun 2021 10:41)      PHYSICAL EXAM:    General: NAD  HEENT: NC/AT; PERRL, clear conjunctiva  Neck: supple  Respiratory: CTA b/l  Cardiovascular: +S1/S2; RRR  Abdomen: soft, NT/ND; +BS x4  Extremities: WWP, 2+ peripheral pulses b/l; no LE edema  Skin: normal color and turgor; no rash  Neurological: CN 2-12 grossly intact     MEDICATIONS:  MEDICATIONS  (STANDING):  amLODIPine   Tablet 10 milliGRAM(s) Oral daily  chlorhexidine 2% Cloths 1 Application(s) Topical <User Schedule>  dexAMETHasone     Tablet 6 milliGRAM(s) Oral every 24 hours  enoxaparin Injectable 40 milliGRAM(s) SubCutaneous every 12 hours  insulin lispro (ADMELOG) corrective regimen sliding scale   SubCutaneous three times a day before meals  insulin lispro (ADMELOG) corrective regimen sliding scale   SubCutaneous at bedtime  pantoprazole    Tablet 40 milliGRAM(s) Oral before breakfast  remdesivir  IVPB   IV Intermittent   remdesivir  IVPB 100 milliGRAM(s) IV Intermittent every 24 hours    MEDICATIONS  (PRN):  acetaminophen   Tablet .. 650 milliGRAM(s) Oral every 6 hours PRN Temp greater or equal to 38C (100.4F), Mild Pain (1 - 3)  bismuth subsalicylate Liquid 30 milliLiter(s) Oral four times a day PRN Diarrhea      ALLERGIES:  Allergies    Allergy Status Unknown    Intolerances    dairy products (Diarrhea)      LABS:                        14.3   6.87  )-----------( 380      ( 27 Jun 2021 06:11 )             41.9     06-27    139  |  104  |  19  ----------------------------<  113<H>  4.1   |  24  |  0.85    Ca    8.4      27 Jun 2021 06:11  Phos  2.6     06-27  Mg     2.3     06-27    TPro  6.7  /  Alb  3.7  /  TBili  0.4  /  DBili  x   /  AST  46<H>  /  ALT  41  /  AlkPhos  96  06-26          RADIOLOGY & ADDITIONAL TESTS: Reviewed. Hospital Course: 50 YOM w/ PMH of HTN, IBS, Obesity, unvaccinated from COVID who presented for 1 week of hypoxic respiratory failure 2/2 COVID. Symptoms started 6/14 with weakness, initially low grade fever, myalgias, loss of taste and smell, tested positive for Covid 6/16, with Tmax 101.9 and progression of GRECO, chest tightness, cough (dry mostly intermittently green and pulse ox reading lowest 79% at rest (up to 90% with deep inspiration), therefore in the setting of hypoxia he decided to come to ED. At ED he was 88%on RA, started on 6L NC saturating initially low 90s%, but subsequently became hypoxic to 82% overnight and placed on NRB with improvement saturating to low 90% while on RMF, for which ICU was consulted. Patient was transferred to ICU in setting of rapid progression of increasing O2 needs, and was given Tocilizumab. While in the ICU patient was continued on steroids and antiviral.  He was proned during the day and night, he reported symptomatic improvement  Supplemental oxygen was weaned from high flow to nasal canula.  He completed course of Remedisivir.     INTERVAL HPI/OVERNIGHT EVENTS: Proned overnight     SUBJECTIVE: Feeling better, breathing has improved, slight cough, no chest tightness or wheezing.   OBJECTIVE:    VITAL SIGNS:  ICU Vital Signs Last 24 Hrs  T(C): 36.9 (27 Jun 2021 14:00), Max: 37.1 (26 Jun 2021 23:00)  T(F): 98.5 (27 Jun 2021 14:00), Max: 98.7 (26 Jun 2021 23:00)  HR: 73 (27 Jun 2021 14:00) (50 - 80)  BP: 143/78 (27 Jun 2021 14:00) (121/72 - 152/89)  BP(mean): 103 (27 Jun 2021 14:00) (88 - 117)  ABP: --  ABP(mean): --  RR: 20 (27 Jun 2021 14:00) (12 - 20)  SpO2: 93% (27 Jun 2021 14:00) (91% - 99%)        06-26 @ 07:01  -  06-27 @ 07:00  --------------------------------------------------------  IN: 250 mL / OUT: 2050 mL / NET: -1800 mL    06-27 @ 07:01  -  06-27 @ 14:50  --------------------------------------------------------  IN: 0 mL / OUT: 300 mL / NET: -300 mL      CAPILLARY BLOOD GLUCOSE      POCT Blood Glucose.: 114 mg/dL (27 Jun 2021 10:41)      PHYSICAL EXAM:    General: NAD  HEENT: NC/AT; PERRL, clear conjunctiva  Neck: supple  Respiratory: CTA b/l  Cardiovascular: +S1/S2; RRR  Abdomen: soft, NT/ND; +BS x4  Extremities: WWP, 2+ peripheral pulses b/l; no LE edema  Skin: normal color and turgor; no rash  Neurological: CN 2-12 grossly intact     MEDICATIONS:  MEDICATIONS  (STANDING):  amLODIPine   Tablet 10 milliGRAM(s) Oral daily  chlorhexidine 2% Cloths 1 Application(s) Topical <User Schedule>  dexAMETHasone     Tablet 6 milliGRAM(s) Oral every 24 hours  enoxaparin Injectable 40 milliGRAM(s) SubCutaneous every 12 hours  insulin lispro (ADMELOG) corrective regimen sliding scale   SubCutaneous three times a day before meals  insulin lispro (ADMELOG) corrective regimen sliding scale   SubCutaneous at bedtime  pantoprazole    Tablet 40 milliGRAM(s) Oral before breakfast  remdesivir  IVPB   IV Intermittent   remdesivir  IVPB 100 milliGRAM(s) IV Intermittent every 24 hours    MEDICATIONS  (PRN):  acetaminophen   Tablet .. 650 milliGRAM(s) Oral every 6 hours PRN Temp greater or equal to 38C (100.4F), Mild Pain (1 - 3)  bismuth subsalicylate Liquid 30 milliLiter(s) Oral four times a day PRN Diarrhea      ALLERGIES:  Allergies    Allergy Status Unknown    Intolerances    dairy products (Diarrhea)      LABS:                        14.3   6.87  )-----------( 380      ( 27 Jun 2021 06:11 )             41.9     06-27    139  |  104  |  19  ----------------------------<  113<H>  4.1   |  24  |  0.85    Ca    8.4      27 Jun 2021 06:11  Phos  2.6     06-27  Mg     2.3     06-27    TPro  6.7  /  Alb  3.7  /  TBili  0.4  /  DBili  x   /  AST  46<H>  /  ALT  41  /  AlkPhos  96  06-26          RADIOLOGY & ADDITIONAL TESTS: Reviewed. Hospital Course: 50 YOM w/ PMH of HTN, IBS, Obesity, unvaccinated from COVID who presented for 1 week of hypoxic respiratory failure 2/2 COVID. Symptoms started 6/14 with weakness, initially low grade fever, myalgias, loss of taste and smell, tested positive for Covid 6/16, with Tmax 101.9 and progression of GRECO, chest tightness, cough (dry mostly intermittently green and pulse ox reading lowest 79% at rest (up to 90% with deep inspiration), therefore in the setting of hypoxia he decided to come to ED. At ED he was 88%on RA, started on 6L NC saturating initially low 90s%, but subsequently became hypoxic to 82% overnight and placed on NRB with improvement saturating to low 90% while on RMF, for which ICU was consulted. Patient was transferred to ICU in setting of rapid progression of increasing O2 needs, and was given Tocilizumab. While in the ICU patient was continued on steroids and antiviral.  He was proned during the day and night, he reported symptomatic improvement  Supplemental oxygen was weaned from high flow to nasal canula.  He completed course of Remedisivir, patient given lab holiday for 6/28.    INTERVAL HPI/OVERNIGHT EVENTS: Proned overnight     SUBJECTIVE: Feeling better, breathing has improved, slight cough, no chest tightness or wheezing.   OBJECTIVE:    VITAL SIGNS:  ICU Vital Signs Last 24 Hrs  T(C): 36.9 (27 Jun 2021 14:00), Max: 37.1 (26 Jun 2021 23:00)  T(F): 98.5 (27 Jun 2021 14:00), Max: 98.7 (26 Jun 2021 23:00)  HR: 73 (27 Jun 2021 14:00) (50 - 80)  BP: 143/78 (27 Jun 2021 14:00) (121/72 - 152/89)  BP(mean): 103 (27 Jun 2021 14:00) (88 - 117)  ABP: --  ABP(mean): --  RR: 20 (27 Jun 2021 14:00) (12 - 20)  SpO2: 93% (27 Jun 2021 14:00) (91% - 99%)        06-26 @ 07:01  -  06-27 @ 07:00  --------------------------------------------------------  IN: 250 mL / OUT: 2050 mL / NET: -1800 mL    06-27 @ 07:01  -  06-27 @ 14:50  --------------------------------------------------------  IN: 0 mL / OUT: 300 mL / NET: -300 mL      CAPILLARY BLOOD GLUCOSE      POCT Blood Glucose.: 114 mg/dL (27 Jun 2021 10:41)      PHYSICAL EXAM:    General: NAD  HEENT: NC/AT; PERRL, clear conjunctiva  Neck: supple  Respiratory: CTA b/l  Cardiovascular: +S1/S2; RRR  Abdomen: soft, NT/ND; +BS x4  Extremities: WWP, 2+ peripheral pulses b/l; no LE edema  Skin: normal color and turgor; no rash  Neurological: CN 2-12 grossly intact     MEDICATIONS:  MEDICATIONS  (STANDING):  amLODIPine   Tablet 10 milliGRAM(s) Oral daily  chlorhexidine 2% Cloths 1 Application(s) Topical <User Schedule>  dexAMETHasone     Tablet 6 milliGRAM(s) Oral every 24 hours  enoxaparin Injectable 40 milliGRAM(s) SubCutaneous every 12 hours  insulin lispro (ADMELOG) corrective regimen sliding scale   SubCutaneous three times a day before meals  insulin lispro (ADMELOG) corrective regimen sliding scale   SubCutaneous at bedtime  pantoprazole    Tablet 40 milliGRAM(s) Oral before breakfast  remdesivir  IVPB   IV Intermittent   remdesivir  IVPB 100 milliGRAM(s) IV Intermittent every 24 hours    MEDICATIONS  (PRN):  acetaminophen   Tablet .. 650 milliGRAM(s) Oral every 6 hours PRN Temp greater or equal to 38C (100.4F), Mild Pain (1 - 3)  bismuth subsalicylate Liquid 30 milliLiter(s) Oral four times a day PRN Diarrhea      ALLERGIES:  Allergies    Allergy Status Unknown    Intolerances    dairy products (Diarrhea)      LABS:                        14.3   6.87  )-----------( 380      ( 27 Jun 2021 06:11 )             41.9     06-27    139  |  104  |  19  ----------------------------<  113<H>  4.1   |  24  |  0.85    Ca    8.4      27 Jun 2021 06:11  Phos  2.6     06-27  Mg     2.3     06-27    TPro  6.7  /  Alb  3.7  /  TBili  0.4  /  DBili  x   /  AST  46<H>  /  ALT  41  /  AlkPhos  96  06-26          RADIOLOGY & ADDITIONAL TESTS: Reviewed. Hospital Course: 50 YOM w/ PMH of HTN, IBS, Obesity, unvaccinated from COVID who presented for 1 week of hypoxic respiratory failure 2/2 COVID. Symptoms started 6/14 with weakness, initially low grade fever, myalgias, loss of taste and smell, tested positive for Covid 6/16, with Tmax 101.9 and progression of GRECO, chest tightness, cough (dry mostly intermittently green and pulse ox reading lowest 79% at rest (up to 90% with deep inspiration), therefore in the setting of hypoxia he decided to come to ED. At ED he was 88%on RA, started on 6L NC saturating initially low 90s%, but subsequently became hypoxic to 82% and placed on NRB with improvement saturating to low 90% while on RMF. After NRB was placed ICU was consulted. Patient was transferred to ICU in setting of rapid progression of increasing O2 needs, and was given Tocilizumab. While in the ICU patient was continued on steroids and antiviral.  He was proned during the day and night, he reported symptomatic improvement  Supplemental oxygen was weaned from high flow to nasal canula.  He completed course of Remedisivir, patient given lab holiday for 6/28.    INTERVAL HPI/OVERNIGHT EVENTS: Proned overnight     SUBJECTIVE: Feeling better, breathing has improved, slight cough, no chest tightness or wheezing.   OBJECTIVE:    VITAL SIGNS:  ICU Vital Signs Last 24 Hrs  T(C): 36.9 (27 Jun 2021 14:00), Max: 37.1 (26 Jun 2021 23:00)  T(F): 98.5 (27 Jun 2021 14:00), Max: 98.7 (26 Jun 2021 23:00)  HR: 73 (27 Jun 2021 14:00) (50 - 80)  BP: 143/78 (27 Jun 2021 14:00) (121/72 - 152/89)  BP(mean): 103 (27 Jun 2021 14:00) (88 - 117)  ABP: --  ABP(mean): --  RR: 20 (27 Jun 2021 14:00) (12 - 20)  SpO2: 93% (27 Jun 2021 14:00) (91% - 99%)        06-26 @ 07:01  -  06-27 @ 07:00  --------------------------------------------------------  IN: 250 mL / OUT: 2050 mL / NET: -1800 mL    06-27 @ 07:01  -  06-27 @ 14:50  --------------------------------------------------------  IN: 0 mL / OUT: 300 mL / NET: -300 mL      CAPILLARY BLOOD GLUCOSE      POCT Blood Glucose.: 114 mg/dL (27 Jun 2021 10:41)      PHYSICAL EXAM:    General: NAD  HEENT: NC/AT; PERRL, clear conjunctiva  Neck: supple  Respiratory: CTA b/l  Cardiovascular: +S1/S2; RRR  Abdomen: soft, NT/ND; +BS x4  Extremities: WWP, 2+ peripheral pulses b/l; no LE edema  Skin: normal color and turgor; no rash  Neurological: CN 2-12 grossly intact     MEDICATIONS:  MEDICATIONS  (STANDING):  amLODIPine   Tablet 10 milliGRAM(s) Oral daily  chlorhexidine 2% Cloths 1 Application(s) Topical <User Schedule>  dexAMETHasone     Tablet 6 milliGRAM(s) Oral every 24 hours  enoxaparin Injectable 40 milliGRAM(s) SubCutaneous every 12 hours  insulin lispro (ADMELOG) corrective regimen sliding scale   SubCutaneous three times a day before meals  insulin lispro (ADMELOG) corrective regimen sliding scale   SubCutaneous at bedtime  pantoprazole    Tablet 40 milliGRAM(s) Oral before breakfast  remdesivir  IVPB   IV Intermittent   remdesivir  IVPB 100 milliGRAM(s) IV Intermittent every 24 hours    MEDICATIONS  (PRN):  acetaminophen   Tablet .. 650 milliGRAM(s) Oral every 6 hours PRN Temp greater or equal to 38C (100.4F), Mild Pain (1 - 3)  bismuth subsalicylate Liquid 30 milliLiter(s) Oral four times a day PRN Diarrhea      ALLERGIES:  Allergies    Allergy Status Unknown    Intolerances    dairy products (Diarrhea)      LABS:                        14.3   6.87  )-----------( 380      ( 27 Jun 2021 06:11 )             41.9     06-27    139  |  104  |  19  ----------------------------<  113<H>  4.1   |  24  |  0.85    Ca    8.4      27 Jun 2021 06:11  Phos  2.6     06-27  Mg     2.3     06-27    TPro  6.7  /  Alb  3.7  /  TBili  0.4  /  DBili  x   /  AST  46<H>  /  ALT  41  /  AlkPhos  96  06-26          RADIOLOGY & ADDITIONAL TESTS: Reviewed.

## 2021-06-27 NOTE — PROGRESS NOTE ADULT - ASSESSMENT
50YOM w/ PMH of HTN, IBS, Obesity, unvaccinated from Covid who presented for 1 week of hypoxic respiratory failure 2/2 covid pneumonia, patient started on decadron and Remdesivir, now given Tocilizumab and transfered to MICU for increased O2 requirements       NEURO:  Awake alert no neurological issue at this time.     PULM:  #Acute Hypoxic respiratory failure secondary to COVID-19  Patient was sent to MICU for management of oxygen requirements.  Patient remains on high flow today with no increase in O2 demands.     - Continue with HFNC in negative pressure room   - Continue Dexamethasone 6mg daily for 10 days, first day was 6/23  - Continue Remdesivir 100 daily for 4 days, 6/23-6/27  - Tocilizumab on 6/24  - Out of bed to chair   - Prone position during day and night  - Sliding Scale while on steroids   - Held CPAP today 6/26    CARDIO:  #PMH of HTN:   At home on Amlodipine 10mg daily and Losartan-HCTZ 50-12.5mg daily. Currently blood pressure in good ranges.   - Continue with Amlodipine   - Will reintroduce other antihypertensive when clinically appropriate     GI:  #PMH of IBS:  Takes Bismuth at home, however is not experiencing diarrhea at this moment.      RENAL:  #JANIS - Now Resolved   Patient presented with Cr 1.34 with improvement to 0.90 this , likely prerenal as etiology in setting of improvement.   - Follow up repeat creatinine level      ENDO:  No acute issue at this time.     ID:  #COVID-19 pneumonia management as above       FENA:   F: None   E: Replete lytes PRN K>4, Mg>2  N: DASH/TLC   A: Lovenox    50YOM w/ PMH of HTN, IBS, Obesity, unvaccinated from Covid who presented for 1 week of hypoxic respiratory failure 2/2 covid pneumonia, patient started on decadron and Remdesivir, now given Tocilizumab and transfered to MICU for increased O2 requirements       NEURO:  Awake alert no neurological issue at this time.     PULM:  #Acute Hypoxic respiratory failure secondary to COVID-19  Patient was sent to MICU for management of oxygen requirements.  Patient remains on high flow today with no increase in O2 demands.     - Transition to NC or Venti as tolerated   - Continue Dexamethasone 6mg daily for 10 days, first day was 6/23  - Continue Remdesivir 100 daily for 4 days, 6/23-6/27  - Tocilizumab on 6/24  - Out of bed to chair   - Prone position during day and night  - Sliding Scale while on steroids   - Held CPAP today 6/26    CARDIO:  #PMH of HTN:   At home on Amlodipine 10mg daily and Losartan-HCTZ 50-12.5mg daily. Currently blood pressure in good ranges.   - Continue with Amlodipine   - Will reintroduce other antihypertensive when clinically appropriate     GI:  #PMH of IBS:  Takes Bismuth at home, however is not experiencing diarrhea at this moment.      RENAL:  #JANIS - Now Resolved   Patient presented with Cr 1.34 with improvement to 0.90 this , likely prerenal as etiology in setting of improvement.   - Follow up repeat creatinine level      ENDO:  No acute issue at this time.     ID:  #COVID-19 pneumonia management as above       FENA:   F: None   E: Replete lytes PRN K>4, Mg>2  N: DASH/TLC   A: Lovenox    50YOM w/ PMH of HTN, IBS, Obesity, unvaccinated from Covid who presented for 1 week of hypoxic respiratory failure 2/2 covid pneumonia, patient started on decadron and Remdesivir, now given Tocilizumab and transfered to MICU for increased O2 requirements       NEURO:  Awake alert no neurological issue at this time.     PULM:  #Acute Hypoxic respiratory failure secondary to COVID-19  Patient was sent to MICU for management of oxygen requirements.  Patient remains on high flow today with no increase in O2 demands.     - Transition to NC or Venti as tolerated   - Continue Dexamethasone 6mg daily for 10 days, first day was 6/23  - Completed Remdesivir 100 daily for 4 days, 6/23-6/27  - Tocilizumab on 6/24  - Out of bed to chair   - Prone position during day and night  - Sliding Scale while on steroids   - Held CPAP today 6/26    CARDIO:  #PMH of HTN:   At home on Amlodipine 10mg daily and Losartan-HCTZ 50-12.5mg daily. Currently blood pressure in good ranges.   - Continue with Amlodipine   - Will reintroduce other antihypertensive when clinically appropriate     GI:  #PMH of IBS:  Takes Bismuth at home, however is not experiencing diarrhea at this moment.      RENAL:  #JANIS - Now Resolved   Patient presented with Cr 1.34 with improvement to 0.90 this , likely prerenal as etiology in setting of improvement.   - Follow up repeat creatinine level      ENDO:  No acute issue at this time.     ID:  #COVID-19 pneumonia management as above       FENA:   F: None   E: Replete lytes PRN K>4, Mg>2  N: DASH/TLC   A: Lovenox

## 2021-06-28 DIAGNOSIS — N17.9 ACUTE KIDNEY FAILURE, UNSPECIFIED: ICD-10-CM

## 2021-06-28 DIAGNOSIS — R63.8 OTHER SYMPTOMS AND SIGNS CONCERNING FOOD AND FLUID INTAKE: ICD-10-CM

## 2021-06-28 LAB
CULTURE RESULTS: SIGNIFICANT CHANGE UP
CULTURE RESULTS: SIGNIFICANT CHANGE UP
GLUCOSE BLDC GLUCOMTR-MCNC: 103 MG/DL — HIGH (ref 70–99)
GLUCOSE BLDC GLUCOMTR-MCNC: 114 MG/DL — HIGH (ref 70–99)
GLUCOSE BLDC GLUCOMTR-MCNC: 151 MG/DL — HIGH (ref 70–99)
GLUCOSE BLDC GLUCOMTR-MCNC: 154 MG/DL — HIGH (ref 70–99)
SPECIMEN SOURCE: SIGNIFICANT CHANGE UP
SPECIMEN SOURCE: SIGNIFICANT CHANGE UP

## 2021-06-28 PROCEDURE — 99233 SBSQ HOSP IP/OBS HIGH 50: CPT | Mod: GC

## 2021-06-28 RX ADMIN — ENOXAPARIN SODIUM 40 MILLIGRAM(S): 100 INJECTION SUBCUTANEOUS at 11:50

## 2021-06-28 RX ADMIN — ENOXAPARIN SODIUM 40 MILLIGRAM(S): 100 INJECTION SUBCUTANEOUS at 23:01

## 2021-06-28 RX ADMIN — Medication 6 MILLIGRAM(S): at 11:49

## 2021-06-28 RX ADMIN — Medication 2: at 16:49

## 2021-06-28 RX ADMIN — PANTOPRAZOLE SODIUM 40 MILLIGRAM(S): 20 TABLET, DELAYED RELEASE ORAL at 06:41

## 2021-06-28 RX ADMIN — CHLORHEXIDINE GLUCONATE 1 APPLICATION(S): 213 SOLUTION TOPICAL at 06:39

## 2021-06-28 RX ADMIN — AMLODIPINE BESYLATE 10 MILLIGRAM(S): 2.5 TABLET ORAL at 06:41

## 2021-06-28 NOTE — PROGRESS NOTE ADULT - PROBLEM SELECTOR PLAN 1
#Acute Hypoxic respiratory failure secondary to COVID-19  Patient was sent to MICU for management of oxygen requirements.  Patient currently saturating at 100% on 6L NC  - Continue Dexamethasone 6mg daily for 10 days, first day was 6/23  - Completed Remdesivir 100 daily for 4 days, 6/23-6/27  - Tocilizumab on 6/24  - Out of bed to chair

## 2021-06-28 NOTE — PROGRESS NOTE ADULT - ASSESSMENT
Neurology: Awake and alert. No neurological issues curently.    Pulmonary:   #Acute Hypoxic respiratory failure secondary to COVID-19  Patient was sent to MICU for management of oxygen requirements.  Patient currently saturating at 100% on 6L NC  - Continue Dexamethasone 6mg daily for 10 days, first day was 6/23  - Completed Remdesivir 100 daily for 4 days, 6/23-6/27  - Tocilizumab on 6/24  - Out of bed to chair   - Sliding Scale while on steroids    Cardiovascular:  #PMH of HTN:   At home on Amlodipine 10mg daily and Losartan-HCTZ 50-12.5mg daily. Currently blood pressure in good ranges.   - Continue with Amlodipine   - Will reintroduce other antihypertensive when clinically appropriate     Gastrointestinal:  #PMH of IBS:  Takes Bismuth at home, however is not experiencing diarrhea at this moment.    Renal:  #JANIS - Now Resolved   Patient presented with Cr 1.34 with improvement to 0.90 this , likely prerenal as etiology in setting of improvement.   - Follow up repeat creatinine level    Musculoskeletal: Not active    Endocrine:  No active issues.    ID:  #COVID-19 pneumonia management as above.    F: None  E: Replete PRN to K>4, Mg>2  N: DASH/TLC, sodium and cholesterol restricted  PPx: enoxaparin Subcutaneous 40mg, SCDs, protonix 40 mg iv qd  Lines:  Code: FULL CODE  Dispo: S/D to [facility]   Assessment and Plan:   · Assessment	  50YOM w/ PMH of HTN, IBS, Obesity, unvaccinated from Covid who presented for 1 week of hypoxic respiratory failure 2/2 covid pneumonia, patient on decadron, completed Remdesivir, given Tocilizumab and transfered to MICU for increased O2 requirements. Now on 6L NC pending transfer to [facility].      Neurology: Awake and alert. No neurological issues curently.    Pulmonary:   #Acute Hypoxic respiratory failure secondary to COVID-19  Patient was sent to MICU for management of oxygen requirements.  Patient currently saturating at 100% on 6L NC  - Continue Dexamethasone 6mg daily for 10 days, first day was 6/23  - Completed Remdesivir 100 daily for 4 days, 6/23-6/27  - Tocilizumab on 6/24  - Out of bed to chair   - Sliding Scale while on steroids    Cardiovascular:  #PMH of HTN:   At home on Amlodipine 10mg daily and Losartan-HCTZ 50-12.5mg daily. Currently blood pressure in good ranges.   - Continue with Amlodipine   - Will reintroduce other antihypertensive when clinically appropriate     Gastrointestinal:  #PMH of IBS:  Takes Bismuth at home, however is not experiencing diarrhea at this moment.    Renal:  #JANIS - Now Resolved   Patient presented with Cr 1.34 with improvement to 0.90 this , likely prerenal as etiology in setting of improvement.   - Follow up repeat creatinine level    Musculoskeletal: Not active    Endocrine:  No active issues.    ID:  #COVID-19 pneumonia management as above.    F: None  E: Replete PRN to K>4, Mg>2  N: DASH/TLC, sodium and cholesterol restricted  PPx: enoxaparin Subcutaneous 40mg, SCDs, protonix 40 mg iv qd  Lines:  Code: FULL CODE  Dispo: S/D to [facility]   Assessment and Plan:   · Assessment	  50YOM w/ PMH of HTN, IBS, Obesity, unvaccinated from Covid who presented for 1 week of hypoxic respiratory failure 2/2 covid pneumonia, patient on decadron, completed Remdesivir, given Tocilizumab and transfered to MICU for increased O2 requirements. Now on 6L NC pending transfer to [facility].      Neurology: Awake and alert. No neurological issues curently.    Pulmonary:   #Acute Hypoxic respiratory failure secondary to COVID-19  Patient was sent to MICU for management of oxygen requirements.  Patient currently saturating at 100% on 6L NC  - Continue Dexamethasone 6mg daily for 10 days, first day was 6/23  - Completed Remdesivir 100 daily for 4 days, 6/23-6/27  - Tocilizumab on 6/24  - Out of bed to chair       Cardiovascular:  #PMH of HTN:   At home on Amlodipine 10mg daily and Losartan-HCTZ 50-12.5mg daily. Currently blood pressure in good ranges.   - Continue with Amlodipine   - Will reintroduce other antihypertensive when clinically appropriate     Gastrointestinal:  #PMH of IBS:  Takes Bismuth at home, however is not experiencing diarrhea at this moment.    Renal:  #JANIS - Now Resolved   Patient presented with Cr 1.34 with improvement to 0.90 this , likely prerenal as etiology in setting of improvement.       Musculoskeletal: Not active    Endocrine:  No active issues.  -- Sliding Scale while on steroids    ID:  #COVID-19 pneumonia management as above.    F: None  E: Replete PRN to K>4, Mg>2  N: DASH/TLC, sodium and cholesterol restricted  PPx: enoxaparin Subcutaneous 40mg, SCDs, protonix 40 mg iv qd  Lines:  Code: FULL CODE  Dispo: S/D to [facility]

## 2021-06-28 NOTE — PROGRESS NOTE ADULT - PROBLEM SELECTOR PLAN 5
F: None  E: Replete PRN to K>4, Mg>2  N: DASH/TLC, sodium and cholesterol restricted  PPx: enoxaparin Subcutaneous 40mg, SCDs, protonix 40 mg iv qd  Code: FULL CODE  Dispo: JAIME

## 2021-06-28 NOTE — PROGRESS NOTE ADULT - SUBJECTIVE AND OBJECTIVE BOX
INTERVAL HPI/OVERNIGHT EVENTS: ONIEL    SUBJECTIVE: Patient is feeling better, able to speak without experiencing SOB. Reports light spasms on his right upper abdominal muscle that comes and goes. No chest tightness or wheezing.    OBJECTIVE:    VITAL SIGNS:  ICU Vital Signs Last 24 Hrs  T(C): 36.9 (28 Jun 2021 09:00), Max: 37.2 (27 Jun 2021 18:00)  T(F): 98.4 (28 Jun 2021 09:00), Max: 98.9 (27 Jun 2021 18:00)  HR: 81 (28 Jun 2021 13:00) (53 - 91)  BP: 120/74 (28 Jun 2021 13:00) (106/61 - 142/81)  BP(mean): 87 (28 Jun 2021 13:00) (75 - 105)  ABP: --  ABP(mean): --  RR: 18 (28 Jun 2021 13:00) (15 - 20)  SpO2: 90% (28 Jun 2021 13:00) (89% - 94%)        06-27 @ 07:01  -  06-28 @ 07:00  --------------------------------------------------------  IN: 0 mL / OUT: 900 mL / NET: -900 mL      CAPILLARY BLOOD GLUCOSE      POCT Blood Glucose.: 114 mg/dL (28 Jun 2021 11:51)      PHYSICAL EXAM:    General: NAD  HEENT: NC/AT; PERRL, clear conjunctiva  Neck: supple  Respiratory: CTA b/l  Cardiovascular: +S1/S2; RRR  Abdomen: soft, NT/ND; +BS x4  Extremities: WWP, 2+ peripheral pulses b/l; no LE edema  Skin: normal color and turgor; no rash  Neurological: AOX3    MEDICATIONS:  MEDICATIONS  (STANDING):  amLODIPine   Tablet 10 milliGRAM(s) Oral daily  chlorhexidine 2% Cloths 1 Application(s) Topical <User Schedule>  dexAMETHasone     Tablet 6 milliGRAM(s) Oral every 24 hours  enoxaparin Injectable 40 milliGRAM(s) SubCutaneous every 12 hours  insulin lispro (ADMELOG) corrective regimen sliding scale   SubCutaneous three times a day before meals  insulin lispro (ADMELOG) corrective regimen sliding scale   SubCutaneous at bedtime  pantoprazole    Tablet 40 milliGRAM(s) Oral before breakfast    MEDICATIONS  (PRN):  acetaminophen   Tablet .. 650 milliGRAM(s) Oral every 6 hours PRN Temp greater or equal to 38C (100.4F), Mild Pain (1 - 3)  bismuth subsalicylate Liquid 30 milliLiter(s) Oral four times a day PRN Diarrhea      ALLERGIES:  Allergies    Allergy Status Unknown    Intolerances    dairy products (Diarrhea)      LABS:                        14.3   6.87  )-----------( 380      ( 27 Jun 2021 06:11 )             41.9     06-27    139  |  104  |  19  ----------------------------<  113<H>  4.1   |  24  |  0.85    Ca    8.4      27 Jun 2021 06:11  Phos  2.6     06-27  Mg     2.3     06-27            RADIOLOGY & ADDITIONAL TESTS: Reviewed. Hospital Course: 50 YOM w/ PMH of HTN, IBS, Obesity, unvaccinated from COVID who presented for 1 week of hypoxic respiratory failure 2/2 COVID. Symptoms started 6/14 with weakness, initially low grade fever, myalgias, loss of taste and smell, tested positive for Covid 6/16, with Tmax 101.9 and progression of GRECO, chest tightness, cough (dry mostly intermittently green and pulse ox reading lowest 79% at rest (up to 90% with deep inspiration), therefore in the setting of hypoxia he decided to come to ED. At ED he was 88%on RA, started on 6L NC saturating initially low 90s%, but subsequently became hypoxic to 82% and placed on NRB with improvement saturating to low 90% while on RMF. After NRB was placed ICU was consulted. Patient was transferred to ICU in setting of rapid progression of increasing O2 needs, and was given Tocilizumab. While in the ICU patient was continued on steroids and antiviral.  He was proned during the day and night, he reported symptomatic improvement  Supplemental oxygen was weaned from high flow to nasal canula.  He completed course of Remedisivir, patient given lab holiday for 6/28.    INTERVAL HPI/OVERNIGHT EVENTS: ONIEL    SUBJECTIVE: Patient is feeling better, able to speak without experiencing SOB. Reports light spasms on his right upper abdominal muscle that comes and goes. No chest tightness or wheezing.    OBJECTIVE:    VITAL SIGNS:  ICU Vital Signs Last 24 Hrs  T(C): 36.9 (28 Jun 2021 09:00), Max: 37.2 (27 Jun 2021 18:00)  T(F): 98.4 (28 Jun 2021 09:00), Max: 98.9 (27 Jun 2021 18:00)  HR: 81 (28 Jun 2021 13:00) (53 - 91)  BP: 120/74 (28 Jun 2021 13:00) (106/61 - 142/81)  BP(mean): 87 (28 Jun 2021 13:00) (75 - 105)  ABP: --  ABP(mean): --  RR: 18 (28 Jun 2021 13:00) (15 - 20)  SpO2: 90% (28 Jun 2021 13:00) (89% - 94%)        06-27 @ 07:01  -  06-28 @ 07:00  --------------------------------------------------------  IN: 0 mL / OUT: 900 mL / NET: -900 mL      CAPILLARY BLOOD GLUCOSE      POCT Blood Glucose.: 114 mg/dL (28 Jun 2021 11:51)      PHYSICAL EXAM:    General: NAD  HEENT: NC/AT; PERRL, clear conjunctiva  Neck: supple  Respiratory: CTA b/l  Cardiovascular: +S1/S2; RRR  Abdomen: soft, NT/ND; +BS x4  Extremities: WWP, 2+ peripheral pulses b/l; no LE edema  Skin: normal color and turgor; no rash  Neurological: AOX3    MEDICATIONS:  MEDICATIONS  (STANDING):  amLODIPine   Tablet 10 milliGRAM(s) Oral daily  chlorhexidine 2% Cloths 1 Application(s) Topical <User Schedule>  dexAMETHasone     Tablet 6 milliGRAM(s) Oral every 24 hours  enoxaparin Injectable 40 milliGRAM(s) SubCutaneous every 12 hours  insulin lispro (ADMELOG) corrective regimen sliding scale   SubCutaneous three times a day before meals  insulin lispro (ADMELOG) corrective regimen sliding scale   SubCutaneous at bedtime  pantoprazole    Tablet 40 milliGRAM(s) Oral before breakfast    MEDICATIONS  (PRN):  acetaminophen   Tablet .. 650 milliGRAM(s) Oral every 6 hours PRN Temp greater or equal to 38C (100.4F), Mild Pain (1 - 3)  bismuth subsalicylate Liquid 30 milliLiter(s) Oral four times a day PRN Diarrhea      ALLERGIES:  Allergies    Allergy Status Unknown    Intolerances    dairy products (Diarrhea)      LABS:                        14.3   6.87  )-----------( 380      ( 27 Jun 2021 06:11 )             41.9     06-27    139  |  104  |  19  ----------------------------<  113<H>  4.1   |  24  |  0.85    Ca    8.4      27 Jun 2021 06:11  Phos  2.6     06-27  Mg     2.3     06-27            RADIOLOGY & ADDITIONAL TESTS: Reviewed. Transfer MICU to [facility]    Hospital Course:  Pt is a 49 yo M with PMH HTN, IBS, and obesity who p/w hypoxia 2/2 known COVID+ 6/16. Went to [facility] 6/12-13 and 6/14 started to have malaise, fevers, chills, myalgias, dec PO, and anosmia. On arrival, was hypoxic and started on decadron/remdesivir, initially admitted to [facility]. Overnight with rapid increase in O2 requirements and transferred to MICU on 15L NRB. Received tocilizumab 6/24 and has been self-proning with improvement, transitioned to HFNC. Completed remdesivir 6/27 and now continued on steroids. 6/27 transitioned to 6L NC with O2sat mid 90s. Now stable for s/d COVID [facility]. Will need home O2 initiated.    INTERVAL HPI/OVERNIGHT EVENTS: ONIEL    SUBJECTIVE: Patient is feeling better, able to speak without experiencing SOB. Reports light spasms on his right upper abdominal muscle that comes and goes. No chest tightness or wheezing.    OBJECTIVE:    VITAL SIGNS:  ICU Vital Signs Last 24 Hrs  T(C): 36.9 (28 Jun 2021 09:00), Max: 37.2 (27 Jun 2021 18:00)  T(F): 98.4 (28 Jun 2021 09:00), Max: 98.9 (27 Jun 2021 18:00)  HR: 81 (28 Jun 2021 13:00) (53 - 91)  BP: 120/74 (28 Jun 2021 13:00) (106/61 - 142/81)  BP(mean): 87 (28 Jun 2021 13:00) (75 - 105)  ABP: --  ABP(mean): --  RR: 18 (28 Jun 2021 13:00) (15 - 20)  SpO2: 90% (28 Jun 2021 13:00) (89% - 94%)        06-27 @ 07:01  -  06-28 @ 07:00  --------------------------------------------------------  IN: 0 mL / OUT: 900 mL / NET: -900 mL      CAPILLARY BLOOD GLUCOSE      POCT Blood Glucose.: 114 mg/dL (28 Jun 2021 11:51)      PHYSICAL EXAM:    General: NAD  HEENT: NC/AT; PERRL, clear conjunctiva  Neck: supple  Respiratory: CTA b/l  Cardiovascular: +S1/S2; RRR  Abdomen: soft, NT/ND; +BS x4  Extremities: WWP, 2+ peripheral pulses b/l; no LE edema  Skin: normal color and turgor; no rash  Neurological: AOX3    MEDICATIONS:  MEDICATIONS  (STANDING):  amLODIPine   Tablet 10 milliGRAM(s) Oral daily  chlorhexidine 2% Cloths 1 Application(s) Topical <User Schedule>  dexAMETHasone     Tablet 6 milliGRAM(s) Oral every 24 hours  enoxaparin Injectable 40 milliGRAM(s) SubCutaneous every 12 hours  insulin lispro (ADMELOG) corrective regimen sliding scale   SubCutaneous three times a day before meals  insulin lispro (ADMELOG) corrective regimen sliding scale   SubCutaneous at bedtime  pantoprazole    Tablet 40 milliGRAM(s) Oral before breakfast    MEDICATIONS  (PRN):  acetaminophen   Tablet .. 650 milliGRAM(s) Oral every 6 hours PRN Temp greater or equal to 38C (100.4F), Mild Pain (1 - 3)  bismuth subsalicylate Liquid 30 milliLiter(s) Oral four times a day PRN Diarrhea      ALLERGIES:  Allergies    Allergy Status Unknown    Intolerances    dairy products (Diarrhea)      LABS:                        14.3   6.87  )-----------( 380      ( 27 Jun 2021 06:11 )             41.9     06-27    139  |  104  |  19  ----------------------------<  113<H>  4.1   |  24  |  0.85    Ca    8.4      27 Jun 2021 06:11  Phos  2.6     06-27  Mg     2.3     06-27            RADIOLOGY & ADDITIONAL TESTS: Reviewed. Transfer MICU to [facility]    Hospital Course:  Pt is a 51 yo M with PMH HTN, IBS, and obesity who p/w hypoxia 2/2 known COVID+ 6/16. Went to [facility] 6/12-13 and 6/14 started to have malaise, fevers, chills, myalgias, dec PO, and anosmia. On arrival, was hypoxic and started on decadron/remdesivir, initially admitted to [facility]. Overnight with rapid increase in O2 requirements and transferred to MICU on 15L NRB. Received tocilizumab 6/24 and has been self-proning with improvement, transitioned to HFNC. Completed remdesivir 6/27 and now continued on steroids to end 7/2. 6/27 transitioned to 6L NC with O2sat mid 90s. Now stable for s/d COVID [facility]. Will need home O2 initiated.    INTERVAL HPI/OVERNIGHT EVENTS: ONIEL    SUBJECTIVE: Patient is feeling better, able to speak without experiencing SOB. Reports light spasms on his right upper abdominal muscle that comes and goes. No chest tightness or wheezing.    OBJECTIVE:    VITAL SIGNS:  ICU Vital Signs Last 24 Hrs  T(C): 36.9 (28 Jun 2021 09:00), Max: 37.2 (27 Jun 2021 18:00)  T(F): 98.4 (28 Jun 2021 09:00), Max: 98.9 (27 Jun 2021 18:00)  HR: 81 (28 Jun 2021 13:00) (53 - 91)  BP: 120/74 (28 Jun 2021 13:00) (106/61 - 142/81)  BP(mean): 87 (28 Jun 2021 13:00) (75 - 105)  ABP: --  ABP(mean): --  RR: 18 (28 Jun 2021 13:00) (15 - 20)  SpO2: 90% (28 Jun 2021 13:00) (89% - 94%)        06-27 @ 07:01  -  06-28 @ 07:00  --------------------------------------------------------  IN: 0 mL / OUT: 900 mL / NET: -900 mL      CAPILLARY BLOOD GLUCOSE      POCT Blood Glucose.: 114 mg/dL (28 Jun 2021 11:51)      PHYSICAL EXAM:    General: NAD  HEENT: NC/AT; PERRL, clear conjunctiva  Neck: supple  Respiratory: CTA b/l  Cardiovascular: +S1/S2; RRR  Abdomen: soft, NT/ND; +BS x4  Extremities: WWP, 2+ peripheral pulses b/l; no LE edema  Skin: normal color and turgor; no rash  Neurological: AOX3    MEDICATIONS:  MEDICATIONS  (STANDING):  amLODIPine   Tablet 10 milliGRAM(s) Oral daily  chlorhexidine 2% Cloths 1 Application(s) Topical <User Schedule>  dexAMETHasone     Tablet 6 milliGRAM(s) Oral every 24 hours  enoxaparin Injectable 40 milliGRAM(s) SubCutaneous every 12 hours  insulin lispro (ADMELOG) corrective regimen sliding scale   SubCutaneous three times a day before meals  insulin lispro (ADMELOG) corrective regimen sliding scale   SubCutaneous at bedtime  pantoprazole    Tablet 40 milliGRAM(s) Oral before breakfast    MEDICATIONS  (PRN):  acetaminophen   Tablet .. 650 milliGRAM(s) Oral every 6 hours PRN Temp greater or equal to 38C (100.4F), Mild Pain (1 - 3)  bismuth subsalicylate Liquid 30 milliLiter(s) Oral four times a day PRN Diarrhea      ALLERGIES:  Allergies    Allergy Status Unknown    Intolerances    dairy products (Diarrhea)      LABS:                        14.3   6.87  )-----------( 380      ( 27 Jun 2021 06:11 )             41.9     06-27    139  |  104  |  19  ----------------------------<  113<H>  4.1   |  24  |  0.85    Ca    8.4      27 Jun 2021 06:11  Phos  2.6     06-27  Mg     2.3     06-27            RADIOLOGY & ADDITIONAL TESTS: Reviewed. Transfer MICU to [facility]    Hospital Course:  Pt is a 49 yo M with PMH HTN, IBS, and obesity who p/w hypoxia 2/2 known COVID+ 6/16. Went to [facility] 6/12-13 and 6/14 started to have malaise, fevers, chills, myalgias, dec PO, and anosmia. On arrival, was hypoxic and started on decadron/remdesivir, initially admitted to [facility]. Overnight with rapid increase in O2 requirements and transferred to MICU on 15L NRB. Received tocilizumab 6/24 and has been self-proning with improvement, transitioned to HFNC. Completed remdesivir 6/27 and now continued on steroids to end 7/2. 6/27 transitioned to 6L NC with O2sat mid 90s. Now stable for s/d COVID [facility]. Might need home O2 initiated.    INTERVAL HPI/OVERNIGHT EVENTS: ONIEL    SUBJECTIVE: Patient is feeling better, able to speak without experiencing SOB. Reports light spasms on his right upper abdominal muscle that comes and goes. No chest tightness or wheezing.    OBJECTIVE:    VITAL SIGNS:  ICU Vital Signs Last 24 Hrs  T(C): 36.9 (28 Jun 2021 09:00), Max: 37.2 (27 Jun 2021 18:00)  T(F): 98.4 (28 Jun 2021 09:00), Max: 98.9 (27 Jun 2021 18:00)  HR: 81 (28 Jun 2021 13:00) (53 - 91)  BP: 120/74 (28 Jun 2021 13:00) (106/61 - 142/81)  BP(mean): 87 (28 Jun 2021 13:00) (75 - 105)  ABP: --  ABP(mean): --  RR: 18 (28 Jun 2021 13:00) (15 - 20)  SpO2: 90% (28 Jun 2021 13:00) (89% - 94%)        06-27 @ 07:01  -  06-28 @ 07:00  --------------------------------------------------------  IN: 0 mL / OUT: 900 mL / NET: -900 mL      CAPILLARY BLOOD GLUCOSE      POCT Blood Glucose.: 114 mg/dL (28 Jun 2021 11:51)      PHYSICAL EXAM:    General: NAD  HEENT: PERRL, clear conjunctiva, MMM  Neck: supple  Respiratory: CTA b/l  Cardiovascular: +S1/S2; RRR  Abdomen: soft, NT/ND; +BS x4  Extremities: WWP, ; no LE edema  Vasc: 2+ peripheral pulses b/l  Skin: normal color and turgor; no rash  Neurological: AOX3  Psych: affect appropriate    MEDICATIONS:  MEDICATIONS  (STANDING):  amLODIPine   Tablet 10 milliGRAM(s) Oral daily  chlorhexidine 2% Cloths 1 Application(s) Topical <User Schedule>  dexAMETHasone     Tablet 6 milliGRAM(s) Oral every 24 hours  enoxaparin Injectable 40 milliGRAM(s) SubCutaneous every 12 hours  insulin lispro (ADMELOG) corrective regimen sliding scale   SubCutaneous three times a day before meals  insulin lispro (ADMELOG) corrective regimen sliding scale   SubCutaneous at bedtime  pantoprazole    Tablet 40 milliGRAM(s) Oral before breakfast    MEDICATIONS  (PRN):  acetaminophen   Tablet .. 650 milliGRAM(s) Oral every 6 hours PRN Temp greater or equal to 38C (100.4F), Mild Pain (1 - 3)  bismuth subsalicylate Liquid 30 milliLiter(s) Oral four times a day PRN Diarrhea      ALLERGIES:  Allergies    Allergy Status Unknown    Intolerances    dairy products (Diarrhea)      LABS:                        14.3   6.87  )-----------( 380      ( 27 Jun 2021 06:11 )             41.9     06-27    139  |  104  |  19  ----------------------------<  113<H>  4.1   |  24  |  0.85    Ca    8.4      27 Jun 2021 06:11  Phos  2.6     06-27  Mg     2.3     06-27            RADIOLOGY & ADDITIONAL TESTS: Reviewed.

## 2021-06-28 NOTE — PROGRESS NOTE ADULT - SUBJECTIVE AND OBJECTIVE BOX
***Note in progress***    OVERNIGHT EVENTS: NAEO    SUBJECTIVE / INTERVAL HPI: Patient seen and examined at bedside. Patient denying chest pain, SOB, palpitations, cough. Patient denies fever, chills, HA, Dizziness, change in vision/hearing, N/V, abdominal pain, diarrhea, constipation, hematochezia/melena, dysuria, hematuria, new onset weakness/numbness, LE pain and/or swelling.    Remaining ROS negative       PHYSICAL EXAM:    General: NAD  HEENT: PERRL, clear conjunctiva, MMM  Neck: supple  Respiratory: CTA b/l  Cardiovascular: +S1/S2; RRR  Abdomen: soft, NT/ND; +BS x4  Extremities: WWP, ; no LE edema  Vasc: 2+ peripheral pulses b/l  Skin: normal color and turgor; no rash  Neurological: AOX3  Psych: affect appropriate    VITAL SIGNS:  Vital Signs Last 24 Hrs  T(C): 37.1 (28 Jun 2021 18:00), Max: 37.1 (27 Jun 2021 22:56)  T(F): 98.8 (28 Jun 2021 18:00), Max: 98.8 (27 Jun 2021 22:56)  HR: 72 (28 Jun 2021 18:00) (53 - 91)  BP: 114/68 (28 Jun 2021 18:00) (106/61 - 142/81)  BP(mean): 85 (28 Jun 2021 18:00) (75 - 105)  RR: 16 (28 Jun 2021 18:00) (15 - 20)  SpO2: 94% (28 Jun 2021 18:00) (89% - 97%)      MEDICATIONS:  MEDICATIONS  (STANDING):  amLODIPine   Tablet 10 milliGRAM(s) Oral daily  chlorhexidine 2% Cloths 1 Application(s) Topical <User Schedule>  dexAMETHasone     Tablet 6 milliGRAM(s) Oral every 24 hours  enoxaparin Injectable 40 milliGRAM(s) SubCutaneous every 12 hours  insulin lispro (ADMELOG) corrective regimen sliding scale   SubCutaneous three times a day before meals  insulin lispro (ADMELOG) corrective regimen sliding scale   SubCutaneous at bedtime  pantoprazole    Tablet 40 milliGRAM(s) Oral before breakfast    MEDICATIONS  (PRN):  acetaminophen   Tablet .. 650 milliGRAM(s) Oral every 6 hours PRN Temp greater or equal to 38C (100.4F), Mild Pain (1 - 3)  bismuth subsalicylate Liquid 30 milliLiter(s) Oral four times a day PRN Diarrhea      ALLERGIES:  Allergies    Allergy Status Unknown    Intolerances    dairy products (Diarrhea)      LABS:                        14.3   6.87  )-----------( 380      ( 27 Jun 2021 06:11 )             41.9     06-27    139  |  104  |  19  ----------------------------<  113<H>  4.1   |  24  |  0.85    Ca    8.4      27 Jun 2021 06:11  Phos  2.6     06-27  Mg     2.3     06-27          CAPILLARY BLOOD GLUCOSE      POCT Blood Glucose.: 151 mg/dL (28 Jun 2021 16:45)      RADIOLOGY & ADDITIONAL TESTS: Reviewed. OVERNIGHT EVENTS: NAEO    SUBJECTIVE / INTERVAL HPI: Patient seen and examined at bedside. Patient denying chest pain, SOB, palpitations, cough. Patient denies fever, chills, HA, Dizziness, change in vision/hearing, N/V, abdominal pain, diarrhea, constipation, hematochezia/melena, dysuria, hematuria, new onset weakness/numbness, LE pain and/or swelling.    Remaining ROS negative       PHYSICAL EXAM:    General: NAD, on 4L NC  HEENT: PERRL, clear conjunctiva, MMM  Neck: supple  Respiratory: CTA b/l  Cardiovascular: +S1/S2; RRR  Abdomen: soft, obese, NT/ND; +BS x4  Extremities: WWP, ; no LE edema  Vasc: 2+ peripheral pulses b/l  Skin: normal color and turgor; no rash  Neurological: AOX3  Psych: affect appropriate    VITAL SIGNS:  Vital Signs Last 24 Hrs  T(C): 37.1 (28 Jun 2021 18:00), Max: 37.1 (27 Jun 2021 22:56)  T(F): 98.8 (28 Jun 2021 18:00), Max: 98.8 (27 Jun 2021 22:56)  HR: 72 (28 Jun 2021 18:00) (53 - 91)  BP: 114/68 (28 Jun 2021 18:00) (106/61 - 142/81)  BP(mean): 85 (28 Jun 2021 18:00) (75 - 105)  RR: 16 (28 Jun 2021 18:00) (15 - 20)  SpO2: 94% (28 Jun 2021 18:00) (89% - 97%)      MEDICATIONS:  MEDICATIONS  (STANDING):  amLODIPine   Tablet 10 milliGRAM(s) Oral daily  chlorhexidine 2% Cloths 1 Application(s) Topical <User Schedule>  dexAMETHasone     Tablet 6 milliGRAM(s) Oral every 24 hours  enoxaparin Injectable 40 milliGRAM(s) SubCutaneous every 12 hours  insulin lispro (ADMELOG) corrective regimen sliding scale   SubCutaneous three times a day before meals  insulin lispro (ADMELOG) corrective regimen sliding scale   SubCutaneous at bedtime  pantoprazole    Tablet 40 milliGRAM(s) Oral before breakfast    MEDICATIONS  (PRN):  acetaminophen   Tablet .. 650 milliGRAM(s) Oral every 6 hours PRN Temp greater or equal to 38C (100.4F), Mild Pain (1 - 3)  bismuth subsalicylate Liquid 30 milliLiter(s) Oral four times a day PRN Diarrhea      ALLERGIES:  Allergies    Allergy Status Unknown    Intolerances    dairy products (Diarrhea)      LABS:                        14.3   6.87  )-----------( 380      ( 27 Jun 2021 06:11 )             41.9     06-27    139  |  104  |  19  ----------------------------<  113<H>  4.1   |  24  |  0.85    Ca    8.4      27 Jun 2021 06:11  Phos  2.6     06-27  Mg     2.3     06-27          CAPILLARY BLOOD GLUCOSE      POCT Blood Glucose.: 151 mg/dL (28 Jun 2021 16:45)      RADIOLOGY & ADDITIONAL TESTS: Reviewed.

## 2021-06-28 NOTE — PROGRESS NOTE ADULT - ASSESSMENT
50YOM w/ PMH of HTN, IBS, Obesity, unvaccinated from Covid who presented for 1 week of hypoxic respiratory failure 2/2 covid pneumonia, patient on decadron, completed Remdesivir, given Tocilizumab and transfered to MICU for increased O2 requirements. Now on 6L NC pending transfer to [facility].

## 2021-06-29 LAB
ALBUMIN SERPL ELPH-MCNC: 3.5 G/DL — SIGNIFICANT CHANGE UP (ref 3.3–5)
ALP SERPL-CCNC: 70 U/L — SIGNIFICANT CHANGE UP (ref 40–120)
ALT FLD-CCNC: 53 U/L — HIGH (ref 10–45)
ANION GAP SERPL CALC-SCNC: 7 MMOL/L — SIGNIFICANT CHANGE UP (ref 5–17)
AST SERPL-CCNC: 29 U/L — SIGNIFICANT CHANGE UP (ref 10–40)
BASOPHILS # BLD AUTO: 0.02 K/UL — SIGNIFICANT CHANGE UP (ref 0–0.2)
BASOPHILS NFR BLD AUTO: 0.2 % — SIGNIFICANT CHANGE UP (ref 0–2)
BILIRUB SERPL-MCNC: 0.5 MG/DL — SIGNIFICANT CHANGE UP (ref 0.2–1.2)
BUN SERPL-MCNC: 19 MG/DL — SIGNIFICANT CHANGE UP (ref 7–23)
CALCIUM SERPL-MCNC: 8.3 MG/DL — LOW (ref 8.4–10.5)
CHLORIDE SERPL-SCNC: 103 MMOL/L — SIGNIFICANT CHANGE UP (ref 96–108)
CO2 SERPL-SCNC: 24 MMOL/L — SIGNIFICANT CHANGE UP (ref 22–31)
CREAT SERPL-MCNC: 0.9 MG/DL — SIGNIFICANT CHANGE UP (ref 0.5–1.3)
D DIMER BLD IA.RAPID-MCNC: 355 NG/ML DDU — HIGH
EOSINOPHIL # BLD AUTO: 0.05 K/UL — SIGNIFICANT CHANGE UP (ref 0–0.5)
EOSINOPHIL NFR BLD AUTO: 0.5 % — SIGNIFICANT CHANGE UP (ref 0–6)
GLUCOSE BLDC GLUCOMTR-MCNC: 114 MG/DL — HIGH (ref 70–99)
GLUCOSE BLDC GLUCOMTR-MCNC: 128 MG/DL — HIGH (ref 70–99)
GLUCOSE BLDC GLUCOMTR-MCNC: 142 MG/DL — HIGH (ref 70–99)
GLUCOSE BLDC GLUCOMTR-MCNC: 99 MG/DL — SIGNIFICANT CHANGE UP (ref 70–99)
GLUCOSE SERPL-MCNC: 111 MG/DL — HIGH (ref 70–99)
HCT VFR BLD CALC: 40.4 % — SIGNIFICANT CHANGE UP (ref 39–50)
HGB BLD-MCNC: 13.9 G/DL — SIGNIFICANT CHANGE UP (ref 13–17)
IMM GRANULOCYTES NFR BLD AUTO: 1.5 % — SIGNIFICANT CHANGE UP (ref 0–1.5)
LYMPHOCYTES # BLD AUTO: 1.7 K/UL — SIGNIFICANT CHANGE UP (ref 1–3.3)
LYMPHOCYTES # BLD AUTO: 17 % — SIGNIFICANT CHANGE UP (ref 13–44)
MAGNESIUM SERPL-MCNC: 2.4 MG/DL — SIGNIFICANT CHANGE UP (ref 1.6–2.6)
MCHC RBC-ENTMCNC: 30.5 PG — SIGNIFICANT CHANGE UP (ref 27–34)
MCHC RBC-ENTMCNC: 34.4 GM/DL — SIGNIFICANT CHANGE UP (ref 32–36)
MCV RBC AUTO: 88.8 FL — SIGNIFICANT CHANGE UP (ref 80–100)
MONOCYTES # BLD AUTO: 0.81 K/UL — SIGNIFICANT CHANGE UP (ref 0–0.9)
MONOCYTES NFR BLD AUTO: 8.1 % — SIGNIFICANT CHANGE UP (ref 2–14)
NEUTROPHILS # BLD AUTO: 7.25 K/UL — SIGNIFICANT CHANGE UP (ref 1.8–7.4)
NEUTROPHILS NFR BLD AUTO: 72.7 % — SIGNIFICANT CHANGE UP (ref 43–77)
NRBC # BLD: 0 /100 WBCS — SIGNIFICANT CHANGE UP (ref 0–0)
PHOSPHATE SERPL-MCNC: 3.1 MG/DL — SIGNIFICANT CHANGE UP (ref 2.5–4.5)
PLATELET # BLD AUTO: 444 K/UL — HIGH (ref 150–400)
POTASSIUM SERPL-MCNC: 3.9 MMOL/L — SIGNIFICANT CHANGE UP (ref 3.5–5.3)
POTASSIUM SERPL-SCNC: 3.9 MMOL/L — SIGNIFICANT CHANGE UP (ref 3.5–5.3)
PROT SERPL-MCNC: 5.8 G/DL — LOW (ref 6–8.3)
RBC # BLD: 4.55 M/UL — SIGNIFICANT CHANGE UP (ref 4.2–5.8)
RBC # FLD: 12.6 % — SIGNIFICANT CHANGE UP (ref 10.3–14.5)
SODIUM SERPL-SCNC: 134 MMOL/L — LOW (ref 135–145)
WBC # BLD: 9.98 K/UL — SIGNIFICANT CHANGE UP (ref 3.8–10.5)
WBC # FLD AUTO: 9.98 K/UL — SIGNIFICANT CHANGE UP (ref 3.8–10.5)

## 2021-06-29 PROCEDURE — 99233 SBSQ HOSP IP/OBS HIGH 50: CPT | Mod: GC

## 2021-06-29 RX ADMIN — AMLODIPINE BESYLATE 10 MILLIGRAM(S): 2.5 TABLET ORAL at 06:44

## 2021-06-29 RX ADMIN — PANTOPRAZOLE SODIUM 40 MILLIGRAM(S): 20 TABLET, DELAYED RELEASE ORAL at 06:44

## 2021-06-29 RX ADMIN — Medication 6 MILLIGRAM(S): at 10:38

## 2021-06-29 RX ADMIN — ENOXAPARIN SODIUM 40 MILLIGRAM(S): 100 INJECTION SUBCUTANEOUS at 11:44

## 2021-06-29 NOTE — PROGRESS NOTE ADULT - PROBLEM SELECTOR PLAN 2
#PMH of IBS:  Takes Bismuth at home, however is not experiencing diarrhea at this moment.
#PMH of IBS:  Takes Bismuth at home, however is not experiencing diarrhea at this moment.
COVID+ x 1 week, now presenting w/ acute hypoxemic respiratory failure as above. CT chest w/ moderate patchy predominantly peripheral groundglass airspace opacities throughout both lungs likely reflective of atypical/viral pneumonia consistent w/ COVID pneumonia. Risk factors include [place] to the beach and unvaccinated status. D-dimer wnl on admission.  - C/w Dexamethasone 6mg x 10 days total  - Approved for Remdesivir x 5 days  - F/u AM procalcitonin given report of productive sputum, if elevated would consider treatment for bacterial pneumonia  - O2 supplementation as above  - Trend CRP and ferritin, repeat d-dimer if new s/s for clot

## 2021-06-29 NOTE — PROGRESS NOTE ADULT - ATTENDING COMMENTS
We will add CPAP 10 one hour every 4 hours and self proning. He has met criteria for tocilizumab, I have explained risks of immunosuppression however with improved mortality and less risk of need for mechanical ventilation.
When self-proning sats increase to 97-98%, continue CPAP 1 hour Q 4h with HFNC. Otherwise he remains stable with tachypnea on exertion.
Acute hypoxic resp failure with COVID 19 pnuemonia with HTN s/p dexamethasone and tocilizumab.   Plan:  - Continue Dexamethasone and remdesivir   - Awake proning  - Continue high flow oxygen  - Rest as above.
Acute hypoxic resp failure with COVID 19 pneumonia with HTN s/p dexamethasone and tocilizumab.   Plan:  - Continue Dexamethasone. d/c remdesivir after today's dose.   - Awake proning to continue  - d/c high flow oxygen and change to nasal cannula  - Rest as above.
Much improved, finish course of steroids and wean oxygen as tolerated.
-S/p Remdesivir  -S/p Toci (6/24)  -Pending completion of Decadron.     -Last CXR 6/25 – b/l infiltrates.   -On 6L NC – sat low 90s w/o complaint.   -No recent fevers. No WBC.     [ ] SW/CM – when O2 requirements decrease sufficiently, consult for Home O2 set up, if appropriate.   [ ] OOB/IS
Continue to wean oxygen requirements, will d/w PCM team to obtain oxygen

## 2021-06-29 NOTE — PROGRESS NOTE ADULT - SUBJECTIVE AND OBJECTIVE BOX
OVERNIGHT EVENTS: NAEO    SUBJECTIVE / INTERVAL HPI: Patient seen and examined at bedside. Patient denying chest pain, SOB, palpitations, cough, fever, chills, HA, N/V, abdominal pain, diarrhea,  LE pain and/or swelling. He does state that in the morning he produces some clear phlegm.     Remaining ROS negative       PHYSICAL EXAM:    General: NAD, on 4L NC  HEENT: PERRL, clear conjunctiva, MMM  Neck: supple  Respiratory: CTA b/l, no wheezing, no rales,  Cardiovascular: +S1/S2; RRR  Abdomen: soft, obese, NT/ND; +BS x4  Extremities: WWP, ; no LE edema  Vasc: 2+ peripheral pulses b/l  Skin: normal color and turgor; no rash  Neurological: AOX3  Psych: affect appropriate    VITAL SIGNS:  Vital Signs Last 24 Hrs  T(C): 37.1 (28 Jun 2021 18:00), Max: 37.1 (27 Jun 2021 22:56)  T(F): 98.8 (28 Jun 2021 18:00), Max: 98.8 (27 Jun 2021 22:56)  HR: 72 (28 Jun 2021 18:00) (53 - 91)  BP: 114/68 (28 Jun 2021 18:00) (106/61 - 142/81)  BP(mean): 85 (28 Jun 2021 18:00) (75 - 105)  RR: 16 (28 Jun 2021 18:00) (15 - 20)  SpO2: 94% (28 Jun 2021 18:00) (89% - 97%)      MEDICATIONS:  MEDICATIONS  (STANDING):  amLODIPine   Tablet 10 milliGRAM(s) Oral daily  chlorhexidine 2% Cloths 1 Application(s) Topical <User Schedule>  dexAMETHasone     Tablet 6 milliGRAM(s) Oral every 24 hours  enoxaparin Injectable 40 milliGRAM(s) SubCutaneous every 12 hours  insulin lispro (ADMELOG) corrective regimen sliding scale   SubCutaneous three times a day before meals  insulin lispro (ADMELOG) corrective regimen sliding scale   SubCutaneous at bedtime  pantoprazole    Tablet 40 milliGRAM(s) Oral before breakfast    MEDICATIONS  (PRN):  acetaminophen   Tablet .. 650 milliGRAM(s) Oral every 6 hours PRN Temp greater or equal to 38C (100.4F), Mild Pain (1 - 3)  bismuth subsalicylate Liquid 30 milliLiter(s) Oral four times a day PRN Diarrhea      ALLERGIES:  Allergies    Allergy Status Unknown    Intolerances    dairy products (Diarrhea)      LABS:                        14.3   6.87  )-----------( 380      ( 27 Jun 2021 06:11 )             41.9     06-27    139  |  104  |  19  ----------------------------<  113<H>  4.1   |  24  |  0.85    Ca    8.4      27 Jun 2021 06:11  Phos  2.6     06-27  Mg     2.3     06-27          CAPILLARY BLOOD GLUCOSE      POCT Blood Glucose.: 151 mg/dL (28 Jun 2021 16:45)      RADIOLOGY & ADDITIONAL TESTS: Reviewed. OVERNIGHT EVENTS: NAEO    SUBJECTIVE / INTERVAL HPI: Patient seen and examined at bedside. Patient denying chest pain, SOB, palpitations, cough, fever, chills, HA, N/V, abdominal pain, diarrhea,  LE pain and/or swelling. He does state that in the morning he produces some greenish phlegm.     Remaining ROS negative       PHYSICAL EXAM:    General: NAD, on 6L NC  HEENT: PERRL, clear conjunctiva, MMM  Neck: supple  Respiratory: CTA b/l, no wheezing, no rales,  Cardiovascular: +S1/S2; RRR  Abdomen: soft, obese, NT/ND; +BS x4  Extremities: WWP; no LE edema  Vasc: 2+ peripheral pulses b/l  Skin: normal color and turgor; no rash  Neurological: AOX3  Psych: affect appropriate    VITAL SIGNS:  Vital Signs Last 24 Hrs  T(C): 37.1 (28 Jun 2021 18:00), Max: 37.1 (27 Jun 2021 22:56)  T(F): 98.8 (28 Jun 2021 18:00), Max: 98.8 (27 Jun 2021 22:56)  HR: 72 (28 Jun 2021 18:00) (53 - 91)  BP: 114/68 (28 Jun 2021 18:00) (106/61 - 142/81)  BP(mean): 85 (28 Jun 2021 18:00) (75 - 105)  RR: 16 (28 Jun 2021 18:00) (15 - 20)  SpO2: 94% (28 Jun 2021 18:00) (89% - 97%)      MEDICATIONS:  MEDICATIONS  (STANDING):  amLODIPine   Tablet 10 milliGRAM(s) Oral daily  chlorhexidine 2% Cloths 1 Application(s) Topical <User Schedule>  dexAMETHasone     Tablet 6 milliGRAM(s) Oral every 24 hours  enoxaparin Injectable 40 milliGRAM(s) SubCutaneous every 12 hours  insulin lispro (ADMELOG) corrective regimen sliding scale   SubCutaneous three times a day before meals  insulin lispro (ADMELOG) corrective regimen sliding scale   SubCutaneous at bedtime  pantoprazole    Tablet 40 milliGRAM(s) Oral before breakfast    MEDICATIONS  (PRN):  acetaminophen   Tablet .. 650 milliGRAM(s) Oral every 6 hours PRN Temp greater or equal to 38C (100.4F), Mild Pain (1 - 3)  bismuth subsalicylate Liquid 30 milliLiter(s) Oral four times a day PRN Diarrhea      ALLERGIES:  Allergies    Allergy Status Unknown    Intolerances    dairy products (Diarrhea)      LABS:                        14.3   6.87  )-----------( 380      ( 27 Jun 2021 06:11 )             41.9     06-27    139  |  104  |  19  ----------------------------<  113<H>  4.1   |  24  |  0.85    Ca    8.4      27 Jun 2021 06:11  Phos  2.6     06-27  Mg     2.3     06-27          CAPILLARY BLOOD GLUCOSE      POCT Blood Glucose.: 151 mg/dL (28 Jun 2021 16:45)      RADIOLOGY & ADDITIONAL TESTS: Reviewed.

## 2021-06-29 NOTE — PROGRESS NOTE ADULT - ASSESSMENT
50YOM w/ PMH of HTN, IBS, Obesity, unvaccinated from Covid who presented for 1 week of hypoxic respiratory failure 2/2 covid pneumonia, patient on decadron, completed Remdesivir, given Tocilizumab and transfered to MICU for increased O2 requirements. Now on 6L NC pending transfer to [facility].           50YOM w/ PMH of HTN, IBS, Obesity, unvaccinated from COVID-19 who presented for 1 week of hypoxic respiratory failure 2/2 COVID pneumonia, patient on decadron, completed Remdesivir, given Tocilizumab and transfered to MICU for increased O2 requirements at 15L O2, now at 6L upon transfer to [facility].

## 2021-06-29 NOTE — PROGRESS NOTE ADULT - PROBLEM SELECTOR PLAN 1
#Acute Hypoxic respiratory failure secondary to COVID-19  Patient was sent to MICU for management of oxygen requirements.  Patient currently saturating at 100% on 6L NC  - Continue Dexamethasone 6mg daily for 10 days, first day was 6/23  - Completed Remdesivir 100 daily for 4 days, 6/23-6/27  - Tocilizumab on 6/24  - Out of bed to chair #Acute Hypoxic respiratory failure secondary to COVID-19  Patient was sent to MICU for management of oxygen requirements.  Patient currently saturating at 100% on 6L NC  - Continue Dexamethasone 6mg daily for 10 days, first day was 6/23  - Completed Remdesivir 100 daily for 4 days, 6/23-6/27  - Tocilizumab on 6/24  - Out of bed to chair. Pt is talking on his cell phone and is ambulating around the room.  - Will continue to provide respiratory support and wean pt down to 4L O2. Pt will be able to be d/c if saturating around 93-94%.  -Will track d-dimer for trends #Acute Hypoxic respiratory failure secondary to COVID-19  Patient was sent to MICU for management of oxygen requirements.  Patient currently saturating at 100% on 6L NC  - Continue Dexamethasone 6mg daily for 10 days, first day was 6/23  - Completed Remdesivir 100 daily for 4 days, 6/23-6/27  - Tocilizumab on 6/24  - Out of bed to chair. Pt is talking on his cell phone and is ambulating around the room.  - Will continue to provide respiratory support and wean pt down to 4L O2. Pt will be able to be d/c if saturating around 93-94%. Pt will be d/c with home O2, CM is aware.    -Will track d-dimer for trends

## 2021-06-29 NOTE — PROGRESS NOTE ADULT - PROBLEM SELECTOR PLAN 3
#PMH of HTN:   At home on Amlodipine 10mg daily and Losartan-HCTZ 50-12.5mg daily. Currently blood pressure in good ranges.   - Continue with Amlodipine   - Will reintroduce other antihypertensive when clinically appropriate
#PMH of HTN:   At home on Amlodipine 10mg daily and Losartan-HCTZ 50-12.5mg daily. Currently blood pressure in good ranges.   - Continue with Amlodipine   - Will reintroduce other antihypertensive when clinically appropriate
Creatinine 1.34 on admission, prior was 1.47 in 11/2020.  - Trend BMP

## 2021-06-29 NOTE — PROGRESS NOTE ADULT - PROBLEM SELECTOR PLAN 4
#JANIS - Now Resolved   Patient presented with Cr 1.34 with improvement to 0.90 this , likely prerenal as etiology in setting of improvement.
#JANIS - Now Resolved   Patient presented with Cr 1.34 with improvement to 0.90 this , likely prerenal as etiology in setting of improvement.
Mild AST elevation on admission to 46.  - Trend CMP in setting of Remdesivir and COVID

## 2021-06-29 NOTE — PROGRESS NOTE ADULT - PROBLEM SELECTOR PROBLEM 5
Nutrition, metabolism, and development symptoms
Nutrition, metabolism, and development symptoms
Hypertension, unspecified type

## 2021-06-29 NOTE — PROGRESS NOTE ADULT - REASON FOR ADMISSION
AHRF 2/2 COVID

## 2021-06-29 NOTE — PROGRESS NOTE ADULT - PROBLEM SELECTOR PLAN 5
F: None  E: Replete PRN to K>4, Mg>2  N: DASH/TLC, sodium and cholesterol restricted  PPx: enoxaparin Subcutaneous 40mg, SCDs, protonix 40 mg iv qd  Code: FULL CODE  Dispo: JAIME F: None  E: Replete PRN to K>4, Mg>2  N: DASH/TLC, sodium and cholesterol restricted  PPx: enoxaparin Subcutaneous 40mg BID, SCDs, protonix 40 mg iv qd  Code: FULL CODE

## 2021-06-30 VITALS
HEART RATE: 66 BPM | TEMPERATURE: 100 F | RESPIRATION RATE: 17 BRPM | SYSTOLIC BLOOD PRESSURE: 121 MMHG | OXYGEN SATURATION: 92 % | DIASTOLIC BLOOD PRESSURE: 74 MMHG

## 2021-06-30 LAB
GLUCOSE BLDC GLUCOMTR-MCNC: 108 MG/DL — HIGH (ref 70–99)
GLUCOSE BLDC GLUCOMTR-MCNC: 115 MG/DL — HIGH (ref 70–99)

## 2021-06-30 PROCEDURE — 82962 GLUCOSE BLOOD TEST: CPT

## 2021-06-30 PROCEDURE — 84484 ASSAY OF TROPONIN QUANT: CPT

## 2021-06-30 PROCEDURE — 85027 COMPLETE CBC AUTOMATED: CPT

## 2021-06-30 PROCEDURE — 87040 BLOOD CULTURE FOR BACTERIA: CPT

## 2021-06-30 PROCEDURE — 71045 X-RAY EXAM CHEST 1 VIEW: CPT

## 2021-06-30 PROCEDURE — 80053 COMPREHEN METABOLIC PANEL: CPT

## 2021-06-30 PROCEDURE — 83735 ASSAY OF MAGNESIUM: CPT

## 2021-06-30 PROCEDURE — 85025 COMPLETE CBC W/AUTO DIFF WBC: CPT

## 2021-06-30 PROCEDURE — 71250 CT THORAX DX C-: CPT

## 2021-06-30 PROCEDURE — 85730 THROMBOPLASTIN TIME PARTIAL: CPT

## 2021-06-30 PROCEDURE — 80048 BASIC METABOLIC PNL TOTAL CA: CPT

## 2021-06-30 PROCEDURE — 84100 ASSAY OF PHOSPHORUS: CPT

## 2021-06-30 PROCEDURE — 85379 FIBRIN DEGRADATION QUANT: CPT

## 2021-06-30 PROCEDURE — 83880 ASSAY OF NATRIURETIC PEPTIDE: CPT

## 2021-06-30 PROCEDURE — 81003 URINALYSIS AUTO W/O SCOPE: CPT

## 2021-06-30 PROCEDURE — 85610 PROTHROMBIN TIME: CPT

## 2021-06-30 PROCEDURE — 87086 URINE CULTURE/COLONY COUNT: CPT

## 2021-06-30 PROCEDURE — 83605 ASSAY OF LACTIC ACID: CPT

## 2021-06-30 PROCEDURE — 94660 CPAP INITIATION&MGMT: CPT

## 2021-06-30 PROCEDURE — 99291 CRITICAL CARE FIRST HOUR: CPT | Mod: 25

## 2021-06-30 PROCEDURE — 96374 THER/PROPH/DIAG INJ IV PUSH: CPT

## 2021-06-30 PROCEDURE — 84443 ASSAY THYROID STIM HORMONE: CPT

## 2021-06-30 PROCEDURE — 96375 TX/PRO/DX INJ NEW DRUG ADDON: CPT

## 2021-06-30 PROCEDURE — 99239 HOSP IP/OBS DSCHRG MGMT >30: CPT | Mod: GC

## 2021-06-30 PROCEDURE — 36415 COLL VENOUS BLD VENIPUNCTURE: CPT

## 2021-06-30 PROCEDURE — 84145 PROCALCITONIN (PCT): CPT

## 2021-06-30 PROCEDURE — 82728 ASSAY OF FERRITIN: CPT

## 2021-06-30 PROCEDURE — 86140 C-REACTIVE PROTEIN: CPT

## 2021-06-30 PROCEDURE — 83036 HEMOGLOBIN GLYCOSYLATED A1C: CPT

## 2021-06-30 PROCEDURE — 87635 SARS-COV-2 COVID-19 AMP PRB: CPT

## 2021-06-30 RX ORDER — DEXAMETHASONE 0.5 MG/5ML
1 ELIXIR ORAL
Qty: 0 | Refills: 0 | DISCHARGE
Start: 2021-06-30 | End: 2021-07-02

## 2021-06-30 RX ORDER — MOMETASONE FUROATE 220 UG/1
2 INHALANT RESPIRATORY (INHALATION)
Qty: 0 | Refills: 0 | DISCHARGE

## 2021-06-30 RX ADMIN — ENOXAPARIN SODIUM 40 MILLIGRAM(S): 100 INJECTION SUBCUTANEOUS at 00:36

## 2021-06-30 RX ADMIN — ENOXAPARIN SODIUM 40 MILLIGRAM(S): 100 INJECTION SUBCUTANEOUS at 11:41

## 2021-06-30 RX ADMIN — PANTOPRAZOLE SODIUM 40 MILLIGRAM(S): 20 TABLET, DELAYED RELEASE ORAL at 06:28

## 2021-06-30 RX ADMIN — AMLODIPINE BESYLATE 10 MILLIGRAM(S): 2.5 TABLET ORAL at 06:28

## 2021-06-30 RX ADMIN — Medication 6 MILLIGRAM(S): at 09:02

## 2021-06-30 NOTE — DISCHARGE NOTE NURSING/CASE MANAGEMENT/SOCIAL WORK - NSDCFUADDAPPT_GEN_ALL_CORE_FT
Please bring your Insurance card, Photo ID and Discharge paperwork to the following appointments:    (1) Please follow up with your Pulmonary Medicine Provider, Dr. Cristian Solis on behalf of Dr. Diego Mcconnell at [address] Floor, [place] [address] on 07/15/2021at 11:20am.    Appointment was scheduled by Ms. JOSE DAVID Perez, Referral Coordinator.    (2) Please follow up with your Primary Care Provider, Dr. Morgan Herbert at [address] [address] on 07/23/2021 at 3:15pm.    Appointment was scheduled by Ms. JOSE DAVID Perez, Referral Coordinator.

## 2021-06-30 NOTE — DISCHARGE NOTE PROVIDER - HOSPITAL COURSE
#Discharge: do not delete    Patient is __ yo M/F with past medical history of _____, presented with _____, found to have _____    Inpatient treatment course:     Problem List/Main Diagnoses:     New medications/therapies:   New lines/hardware:  Labs to be followed outpatient:   Exam to be followed outpatient:     Discharge plan: discharge to ______     #Discharge: do not delete    Patient is 51 yo M with past medical history of HTN, IBS, and obesity, presented with hypoxia 2/2 COVID positive    Inpatient treatment course:   o/n: continued Dexa. approved for Remdesi. 82% on 6LNC in the AM, placed on 15L NRB saturating 97%. Repeat CXR w/ worsened b/l infiltrates.  6/24: transitioned to HFNC, given tocilizumab, on CPAP 10 60% for 1 hr q4h,   o/n: lizbeth, self proned then self supined  6/25: Increase in saturations to 98% with proning. intermittent CPAP.    6/26: held CPAP today, self proned  o/n self-prone, lizbeth remained on HFNC 45L/60%   6/27: Weaned Fio2 to 50%. OOBTC saturating 94%. Weaned to 6L NC saturating 93%.   o/n: lizbeth  6/28: lab holiday, O2 sat mid 90s 6L NC, up for step down RMF, dec NC 4L    Problem List/Main Diagnoses:   Problem/Plan - 1:  ·  Problem: Acute hypoxemic respiratory failure due to COVID-19.  Plan: #Acute Hypoxic respiratory failure secondary to COVID-19  Patient was sent to MICU for management of oxygen requirements.  Patient currently saturating at 100% on 6L NC  - Continue Dexamethasone 6mg daily for 10 days, first day was 6/23  - Completed Remdesivir 100 daily for 4 days, 6/23-6/27  - Tocilizumab on 6/24  - Out of bed to chair. Pt is talking on his cell phone and is ambulating around the room.  - Will continue to provide respiratory support and wean pt down to 4L O2. Pt will be able to be d/c if saturating around 93-94%. Pt will be d/c with home O2, CM is aware.    -Will track d-dimer for trends.     Problem/Plan - 2:  ·  Problem: Irritable bowel syndrome with diarrhea.  Plan: #PMH of IBS:  Takes Bismuth at home, however is not experiencing diarrhea at this moment.     Problem/Plan - 3:  ·  Problem: Hypertension, unspecified type.  Plan: #PMH of HTN:   At home on Amlodipine 10mg daily and Losartan-HCTZ 50-12.5mg daily. Currently blood pressure in good ranges.   - Continue with Amlodipine   - Will reintroduce other antihypertensive when clinically appropriate.     Problem/Plan - 4:  ·  Problem: JANIS (acute kidney injury).  Plan: #JANIS - Now Resolved   Patient presented with Cr 1.34 with improvement to 0.90 this , likely prerenal as etiology in setting of improvement.     Problem/Plan - 5:  ·  Problem: Nutrition, metabolism, and development symptoms.  Plan: F: None  E: Replete PRN to K>4, Mg>2  N: DASH/TLC, sodium and cholesterol restricted  PPx: enoxaparin Subcutaneous 40mg BID, SCDs, protonix 40 mg iv qd  Code: FULL CODE.   New medications/therapies:   New lines/hardware:  Labs to be followed outpatient:   Exam to be followed outpatient:     Discharge plan: discharge to ______     #Discharge: do not delete    Patient is 49 yo man with past medical history of HTN, IBS, obesity, and unvaccinated from COVID-19 presented with hypoxia 2/2 COVID positive since 6/16/21.    Inpatient treatment course:   Pt placed on Dexa therapy, approved for Remdesivir. 82% on 6LNC in the AM, placed on 15L NRB saturating 97%. Repeat CXR w/ worsened b/l infiltrates. While in the ICU, pt was transitioned to HFNC, given tocilizumab, on CPAP 10 60% for 1 hr q4h. Pt began to have O2 saturations at 98% on 6/26 and was transferred to [facility]. Pt was weaned down to 4L O2 and was able to maintain oxygen levels at 93-94% during ambulation. We tried to remove NC, but pt O2 saturations went down to 88%.          Problem/Plan - 1:  ·  Problem: Acute hypoxemic respiratory failure due to COVID-19.  Plan: #Acute Hypoxic respiratory failure secondary to COVID-19  Patient was sent to MICU for management of oxygen requirements.  Patient currently saturating at 93-94% on 4L NC. Below is pt course of COVID protocol treatment:   - Continue Dexamethasone 6mg daily for 10 days, first day was 6/23  - Completed Remdesivir 100 daily for 4 days, 6/23-6/27  - Tocilizumab on 6/24  - Out of bed to chair. Pt is talking on his cell phone and is ambulating around the room.  - Will continue respiratory support to be d/c with 4L O2.  - D-Dimer Assay, Quantitative: 355: The negative cutoff limit for DVT or PE is 230 D-DU ng/mL.     Problem/Plan - 2: Irritable bowel syndrome with diarrhea.    Takes Bismuth at home, will continue home meds upon discharge.     Problem/Plan - 3: Hypertension, unspecified type.   At home on Amlodipine 10mg daily and Losartan-HCTZ 50-12.5mg daily. Blood pressure in good ranges, will continue home meds upon discharge.    Problem/Plan - 4: JANIS (acute kidney injury).    Patient presented with Cr 1.34 with improvement to 0.90 during hospital stay, likely prerenal as etiology in setting of improvement.       New medications/therapies: 4L O2  New lines/hardware: none  Labs to be followed outpatient: none   Exam to be followed outpatient: none    Discharge plan: discharge to home     #Discharge: do not delete    Patient is 49 yo man with past medical history of HTN, IBS, obesity, and unvaccinated from COVID-19 presented with hypoxia 2/2 COVID positive since 6/16/21.    Inpatient treatment course:   Pt placed on Dexa therapy, approved for Remdesivir. 82% on 6LNC in the AM, placed on 15L NRB saturating 97%. Repeat CXR w/ worsened b/l infiltrates. While in the ICU, pt was transitioned to HFNC, given tocilizumab, on CPAP 10 60% for 1 hr q4h. Pt began to have O2 saturations at 98% on 6/26 and was transferred to [facility]. Pt was weaned down to 4L O2 and was able to maintain oxygen levels at 93-94% during ambulation. We tried to remove NC, but pt O2 saturations went down to 88%.          Problem/Plan - 1:  ·  Problem: Acute hypoxemic respiratory failure due to COVID-19.  Plan: #Acute Hypoxic respiratory failure secondary to COVID-19  Patient was sent to MICU for management of oxygen requirements.  Patient currently saturating at 93-94% on 4L NC. Below is pt course of COVID protocol treatment:   - Continue Dexamethasone 6mg daily for 10 days, first day was 6/23  - Completed Remdesivir 100 daily for 4 days, 6/23-6/27  - Tocilizumab on 6/24  - Out of bed to chair. Pt is talking on his cell phone and is ambulating around the room.  - Will continue respiratory support to be d/c with 4L O2.  - D-Dimer Assay, Quantitative: 355: The negative cutoff limit for DVT or PE is 230 D-DU ng/mL.     Problem/Plan - 2: Irritable bowel syndrome with diarrhea.    Takes Bismuth at home, will continue home meds upon discharge.     Problem/Plan - 3: Hypertension, unspecified type.   At home on Amlodipine 10mg daily and Losartan-HCTZ 50-12.5mg daily. Blood pressure in good ranges, will continue home meds upon discharge.    Problem/Plan - 4: JANIS (acute kidney injury).    Patient presented with Cr 1.34 with improvement to 0.90 during hospital stay, likely prerenal as etiology in setting of improvement.       New medications/therapies: 4L O2, dexamethasone 6mg x 2 days  New lines/hardware: none  Labs to be followed outpatient: none   Exam to be followed outpatient: none    Discharge plan: discharge to home

## 2021-06-30 NOTE — DISCHARGE NOTE PROVIDER - NSDCFUADDAPPT_GEN_ALL_CORE_FT
Please bring your Insurance card, Photo ID and Discharge paperwork to the following appointments:    (1) Please follow up with your Pulmonary Medicine Provider, Dr. Cristian Solis on behalf of Dr. Diego Mcconnell at [address], [address] Floor, [place] [address] on 07/15/2021at 11:20am.    Appointment was scheduled by Ms. JOSE DAVID Perez, Referral Coordinator.    (2) Please follow up with your Primary Care Provider, Dr. Morgan Herbert at [address] [address] on 07/23/2021 at 3:15pm.    Appointment was scheduled by Ms. JOSE DAVID Perez, Referral Coordinator.

## 2021-06-30 NOTE — DISCHARGE NOTE NURSING/CASE MANAGEMENT/SOCIAL WORK - PATIENT PORTAL LINK FT
You can access the FollowMyHealth Patient Portal offered by [facility] by registering at the following website: [email or web address]. By joining [employer]’s FollowMyHealth portal, you will also be able to view your health information using other applications (apps) compatible with our system.

## 2021-06-30 NOTE — DISCHARGE NOTE PROVIDER - CARE PROVIDER_API CALL
JOSHUA ANDRADE  [id number]  [address] [place] [id number]  Phone: ()-  Fax: ()-  Follow Up Time: 1 week    Sajan Mcconnell  Phone: [phone]  Fax: (   )    -  Follow Up Time: 1 week   JOSHUA ANDRADE  [id number]  [address] [place] [address]  Phone: ()-  Fax: ()-  Scheduled Appointment: 07/23/2021 03:15 PM    Cristian Solis  [facility] PULMONARY MEDICINE  [address] [place] [address]  Phone: [phone]  Fax: (   )    -  Scheduled Appointment: 07/15/2021 11:20 AM

## 2021-06-30 NOTE — DISCHARGE NOTE PROVIDER - NSDCCPCAREPLAN_GEN_ALL_CORE_FT
PRINCIPAL DISCHARGE DIAGNOSIS  Diagnosis: COVID-19  Assessment and Plan of Treatment:       SECONDARY DISCHARGE DIAGNOSES  Diagnosis: Hypoxia  Assessment and Plan of Treatment:      PRINCIPAL DISCHARGE DIAGNOSIS  Diagnosis: Acute hypoxemic respiratory failure due to COVID-19  Assessment and Plan of Treatment: You came to the hospital due to shortness of breath which was found to be contributed by your diagnosis of COVID-19 infection. You were treated with medications aimed to help address your symptoms. When you leave the hospital, please continue to take your dexamethasone 6mg x 2 days to complete your treatment course.

## 2021-06-30 NOTE — DISCHARGE NOTE PROVIDER - PROVIDER TOKENS
PROVIDER:[TOKEN:[[id number]],FOLLOWUP:[1 week]],FREE:[LAST:[Enedina],FIRST:[Sajan],PHONE:[phone]:[(   )    -],FOLLOWUP:[1 week]] PROVIDER:[TOKEN:[[id number]],SCHEDULEDAPPT:[07/23/2021],SCHEDULEDAPPTTIME:[03:15 PM]],FREE:[LAST:[Will],FIRST:[Cristian],PHONE:[phone]],FAX:[(   )    -],ADDRESS:[[facility] PULMONARY MEDICINE  [address] [place] [address], [address] [place] [address]],SCHEDULEDAPPT:[07/15/2021],SCHEDULEDAPPTTIME:[11:20 AM]]

## 2021-06-30 NOTE — DISCHARGE NOTE PROVIDER - NSDCMRMEDTOKEN_GEN_ALL_CORE_FT
amLODIPine 10 mg oral tablet: 1 tab(s) orally once a day  Asmanex  mcg/inh inhalation aerosol: 2  inhaled 2 times a day  bismuth subsalicylate 262 mg oral tablet, chewable: 1 tab(s) orally 4 times a day, As Needed  losartan-hydrochlorothiazide 50mg-12.5mg oral tablet: 1 tab(s) orally once a day   amLODIPine 10 mg oral tablet: 1 tab(s) orally once a day  bismuth subsalicylate 262 mg oral tablet, chewable: 1 tab(s) orally 4 times a day, As Needed  dexamethasone 6 mg oral tablet: 1 tab(s) orally every 24 hours  losartan-hydrochlorothiazide 50mg-12.5mg oral tablet: 1 tab(s) orally once a day

## 2021-07-01 RX ORDER — DEXAMETHASONE 0.5 MG/5ML
1 ELIXIR ORAL
Qty: 2 | Refills: 0
Start: 2021-07-01 | End: 2021-07-02

## 2021-07-13 DIAGNOSIS — U07.1 COVID-19: ICD-10-CM

## 2021-07-13 DIAGNOSIS — J12.82 PNEUMONIA DUE TO CORONAVIRUS DISEASE 2019: ICD-10-CM

## 2021-07-13 DIAGNOSIS — R79.89 OTHER SPECIFIED ABNORMAL FINDINGS OF BLOOD CHEMISTRY: ICD-10-CM

## 2021-07-13 DIAGNOSIS — Z91.011 ALLERGY TO MILK PRODUCTS: ICD-10-CM

## 2021-07-13 DIAGNOSIS — R74.01 ELEVATION OF LEVELS OF LIVER TRANSAMINASE LEVELS: ICD-10-CM

## 2021-07-13 DIAGNOSIS — J96.01 ACUTE RESPIRATORY FAILURE WITH HYPOXIA: ICD-10-CM

## 2021-07-13 DIAGNOSIS — N17.9 ACUTE KIDNEY FAILURE, UNSPECIFIED: ICD-10-CM

## 2021-07-13 DIAGNOSIS — E66.9 OBESITY, UNSPECIFIED: ICD-10-CM

## 2021-07-13 DIAGNOSIS — K59.00 CONSTIPATION, UNSPECIFIED: ICD-10-CM

## 2021-07-13 DIAGNOSIS — I10 ESSENTIAL (PRIMARY) HYPERTENSION: ICD-10-CM

## 2021-07-13 DIAGNOSIS — R06.02 SHORTNESS OF BREATH: ICD-10-CM

## 2021-07-13 DIAGNOSIS — K58.0 IRRITABLE BOWEL SYNDROME WITH DIARRHEA: ICD-10-CM

## 2022-07-22 NOTE — DIETITIAN INITIAL EVALUATION ADULT. - PROBLEM SELECTOR PLAN 5
H/o HTN on Amlodipine 10mg daily and Losartan-HCTZ 50-12.5mg daily.  - C/w home Amlodipine  - Restart home Losartan-HCTZ if creatinine remains stable Mild edema

## 2025-07-10 NOTE — ED ADULT TRIAGE NOTE - ACCOMPANIED BY
What Type Of Note Output Would You Prefer (Optional)?: Standard Output Hpi Title: Evaluation of Skin Lesions Self